# Patient Record
Sex: MALE | Race: WHITE | NOT HISPANIC OR LATINO | Employment: UNEMPLOYED | URBAN - METROPOLITAN AREA
[De-identification: names, ages, dates, MRNs, and addresses within clinical notes are randomized per-mention and may not be internally consistent; named-entity substitution may affect disease eponyms.]

---

## 2020-01-30 ENCOUNTER — APPOINTMENT (OUTPATIENT)
Dept: GENERAL RADIOLOGY | Facility: HOSPITAL | Age: 20
End: 2020-01-30

## 2020-01-30 ENCOUNTER — APPOINTMENT (OUTPATIENT)
Dept: CT IMAGING | Facility: HOSPITAL | Age: 20
End: 2020-01-30

## 2020-01-30 ENCOUNTER — HOSPITAL ENCOUNTER (OUTPATIENT)
Facility: HOSPITAL | Age: 20
Setting detail: OBSERVATION
Discharge: HOME | End: 2020-01-31
Attending: EMERGENCY MEDICINE | Admitting: INTERNAL MEDICINE

## 2020-01-30 ENCOUNTER — APPOINTMENT (OUTPATIENT)
Dept: CARDIOLOGY | Facility: HOSPITAL | Age: 20
End: 2020-01-30

## 2020-01-30 DIAGNOSIS — F15.10 METHAMPHETAMINE ABUSE (HCC): Primary | ICD-10-CM

## 2020-01-30 DIAGNOSIS — D72.829 LEUKOCYTOSIS, UNSPECIFIED TYPE: ICD-10-CM

## 2020-01-30 PROBLEM — E16.2 HYPOGLYCEMIA: Status: ACTIVE | Noted: 2020-01-30

## 2020-01-30 PROBLEM — Z72.0 TOBACCO ABUSE: Chronic | Status: ACTIVE | Noted: 2020-01-30

## 2020-01-30 LAB
ALBUMIN SERPL-MCNC: 5.1 G/DL (ref 3.5–5.2)
ALBUMIN/GLOB SERPL: 1.7 G/DL
ALP SERPL-CCNC: 74 U/L (ref 39–117)
ALT SERPL W P-5'-P-CCNC: 39 U/L (ref 1–41)
AMPHET+METHAMPHET UR QL: POSITIVE
ANION GAP SERPL CALCULATED.3IONS-SCNC: 17 MMOL/L (ref 5–15)
ANISOCYTOSIS BLD QL: ABNORMAL
APTT PPP: 21.7 SECONDS (ref 24–31)
AST SERPL-CCNC: 33 U/L (ref 1–40)
BARBITURATES UR QL SCN: NEGATIVE
BENZODIAZ UR QL SCN: NEGATIVE
BH CV ECHO MEAS - ACS: 1.9 CM
BH CV ECHO MEAS - AO MAX PG (FULL): 0.9 MMHG
BH CV ECHO MEAS - AO MAX PG: 7.6 MMHG
BH CV ECHO MEAS - AO MEAN PG (FULL): -0.06 MMHG
BH CV ECHO MEAS - AO MEAN PG: 3.6 MMHG
BH CV ECHO MEAS - AO ROOT AREA (BSA CORRECTED): 1.6
BH CV ECHO MEAS - AO ROOT AREA: 7.1 CM^2
BH CV ECHO MEAS - AO ROOT DIAM: 3 CM
BH CV ECHO MEAS - AO V2 MAX: 137.5 CM/SEC
BH CV ECHO MEAS - AO V2 MEAN: 86.1 CM/SEC
BH CV ECHO MEAS - AO V2 VTI: 23.6 CM
BH CV ECHO MEAS - AORTIC HR: 77.3 BPM
BH CV ECHO MEAS - AORTIC R-R: 0.78 SEC
BH CV ECHO MEAS - ASC AORTA: 2.5 CM
BH CV ECHO MEAS - AVA(I,A): 3.4 CM^2
BH CV ECHO MEAS - AVA(I,D): 3.4 CM^2
BH CV ECHO MEAS - AVA(V,A): 2.8 CM^2
BH CV ECHO MEAS - AVA(V,D): 2.8 CM^2
BH CV ECHO MEAS - BSA(HAYCOCK): 1.9 M^2
BH CV ECHO MEAS - BSA: 1.9 M^2
BH CV ECHO MEAS - BZI_BMI: 20.3 KILOGRAMS/M^2
BH CV ECHO MEAS - BZI_METRIC_HEIGHT: 182.9 CM
BH CV ECHO MEAS - BZI_METRIC_WEIGHT: 68 KG
BH CV ECHO MEAS - CI(AO): 6.8 L/MIN/M^2
BH CV ECHO MEAS - CI(LVOT): 3.2 L/MIN/M^2
BH CV ECHO MEAS - CO(AO): 12.9 L/MIN
BH CV ECHO MEAS - CO(LVOT): 6.1 L/MIN
BH CV ECHO MEAS - EDV(CUBED): 98.4 ML
BH CV ECHO MEAS - EDV(MOD-SP4): 109 ML
BH CV ECHO MEAS - EDV(TEICH): 98.2 ML
BH CV ECHO MEAS - EF(CUBED): 70.1 %
BH CV ECHO MEAS - EF(MOD-BP): 71 %
BH CV ECHO MEAS - EF(MOD-SP4): 70.9 %
BH CV ECHO MEAS - EF(TEICH): 61.8 %
BH CV ECHO MEAS - ESV(CUBED): 29.4 ML
BH CV ECHO MEAS - ESV(MOD-SP4): 31.8 ML
BH CV ECHO MEAS - ESV(TEICH): 37.5 ML
BH CV ECHO MEAS - FS: 33.2 %
BH CV ECHO MEAS - IVS/LVPW: 0.77
BH CV ECHO MEAS - IVSD: 0.75 CM
BH CV ECHO MEAS - LA DIMENSION(2D): 2.6 CM
BH CV ECHO MEAS - LV DIASTOLIC VOL/BSA (35-75): 57.8 ML/M^2
BH CV ECHO MEAS - LV MASS(C)D: 131.4 GRAMS
BH CV ECHO MEAS - LV MASS(C)DI: 69.7 GRAMS/M^2
BH CV ECHO MEAS - LV MAX PG: 6.7 MMHG
BH CV ECHO MEAS - LV MEAN PG: 3.6 MMHG
BH CV ECHO MEAS - LV SYSTOLIC VOL/BSA (12-30): 16.9 ML/M^2
BH CV ECHO MEAS - LV V1 MAX: 129.1 CM/SEC
BH CV ECHO MEAS - LV V1 MEAN: 88.7 CM/SEC
BH CV ECHO MEAS - LV V1 VTI: 26.7 CM
BH CV ECHO MEAS - LVIDD: 4.6 CM
BH CV ECHO MEAS - LVIDS: 3.1 CM
BH CV ECHO MEAS - LVOT AREA: 3 CM^2
BH CV ECHO MEAS - LVOT DIAM: 1.9 CM
BH CV ECHO MEAS - LVPWD: 0.98 CM
BH CV ECHO MEAS - MV A MAX VEL: 47.1 CM/SEC
BH CV ECHO MEAS - MV DEC SLOPE: 1073 CM/SEC^2
BH CV ECHO MEAS - MV DEC TIME: 0.09 SEC
BH CV ECHO MEAS - MV E MAX VEL: 99.9 CM/SEC
BH CV ECHO MEAS - MV E/A: 2.1
BH CV ECHO MEAS - MV MAX PG: 5.1 MMHG
BH CV ECHO MEAS - MV MEAN PG: 2.2 MMHG
BH CV ECHO MEAS - MV V2 MAX: 112.7 CM/SEC
BH CV ECHO MEAS - MV V2 MEAN: 70 CM/SEC
BH CV ECHO MEAS - MV V2 VTI: 25 CM
BH CV ECHO MEAS - MVA(VTI): 3.2 CM^2
BH CV ECHO MEAS - PA ACC TIME: 0.11 SEC
BH CV ECHO MEAS - PA MAX PG (FULL): -1.5 MMHG
BH CV ECHO MEAS - PA MAX PG: 5.7 MMHG
BH CV ECHO MEAS - PA MEAN PG (FULL): -0.48 MMHG
BH CV ECHO MEAS - PA MEAN PG: 2.6 MMHG
BH CV ECHO MEAS - PA PR(ACCEL): 31 MMHG
BH CV ECHO MEAS - PA V2 MAX: 119.6 CM/SEC
BH CV ECHO MEAS - PA V2 MEAN: 73.4 CM/SEC
BH CV ECHO MEAS - PA V2 VTI: 21.5 CM
BH CV ECHO MEAS - PULM A REVS DUR: 0.11 SEC
BH CV ECHO MEAS - PULM A REVS VEL: 31.2 CM/SEC
BH CV ECHO MEAS - PULM DIAS VEL: 59 CM/SEC
BH CV ECHO MEAS - PULM S/D: 1.5
BH CV ECHO MEAS - PULM SYS VEL: 86.5 CM/SEC
BH CV ECHO MEAS - PVA(I,A): 5.9 CM^2
BH CV ECHO MEAS - PVA(I,D): 5.9 CM^2
BH CV ECHO MEAS - PVA(V,A): 5.5 CM^2
BH CV ECHO MEAS - PVA(V,D): 5.5 CM^2
BH CV ECHO MEAS - QP/QS: 1.6
BH CV ECHO MEAS - RAP SYSTOLE: 3 MMHG
BH CV ECHO MEAS - RV MAX PG: 7.3 MMHG
BH CV ECHO MEAS - RV MEAN PG: 3.1 MMHG
BH CV ECHO MEAS - RV V1 MAX: 134.8 CM/SEC
BH CV ECHO MEAS - RV V1 MEAN: 79.2 CM/SEC
BH CV ECHO MEAS - RV V1 VTI: 26.2 CM
BH CV ECHO MEAS - RVDD: 2.6 CM
BH CV ECHO MEAS - RVOT AREA: 4.9 CM^2
BH CV ECHO MEAS - RVOT DIAM: 2.5 CM
BH CV ECHO MEAS - RVSP: 11.6 MMHG
BH CV ECHO MEAS - SI(AO): 88.5 ML/M^2
BH CV ECHO MEAS - SI(CUBED): 36.6 ML/M^2
BH CV ECHO MEAS - SI(LVOT): 42 ML/M^2
BH CV ECHO MEAS - SI(MOD-SP4): 41 ML/M^2
BH CV ECHO MEAS - SI(TEICH): 32.2 ML/M^2
BH CV ECHO MEAS - SV(AO): 166.8 ML
BH CV ECHO MEAS - SV(CUBED): 69.1 ML
BH CV ECHO MEAS - SV(LVOT): 79.1 ML
BH CV ECHO MEAS - SV(MOD-SP4): 77.3 ML
BH CV ECHO MEAS - SV(RVOT): 127.8 ML
BH CV ECHO MEAS - SV(TEICH): 60.7 ML
BH CV ECHO MEAS - TR MAX VEL: 146.8 CM/SEC
BILIRUB SERPL-MCNC: 0.8 MG/DL (ref 0.2–1.2)
BILIRUB UR QL STRIP: NEGATIVE
BUN BLD-MCNC: 25 MG/DL (ref 6–20)
BUN/CREAT SERPL: 20.5 (ref 7–25)
CA-I SERPL ISE-MCNC: 1.23 MMOL/L (ref 1.2–1.3)
CALCIUM SPEC-SCNC: 9.5 MG/DL (ref 8.6–10.5)
CANNABINOIDS SERPL QL: POSITIVE
CHLORIDE SERPL-SCNC: 99 MMOL/L (ref 98–107)
CK SERPL-CCNC: 275 U/L (ref 20–200)
CLARITY UR: CLEAR
CO2 SERPL-SCNC: 23 MMOL/L (ref 22–29)
COCAINE UR QL: NEGATIVE
COLOR UR: YELLOW
CREAT BLD-MCNC: 0.96 MG/DL (ref 0.76–1.27)
CREAT BLD-MCNC: 1.22 MG/DL (ref 0.76–1.27)
D-LACTATE SERPL-SCNC: 0.7 MMOL/L (ref 0.5–2)
D-LACTATE SERPL-SCNC: 0.8 MMOL/L (ref 0.5–2)
D-LACTATE SERPL-SCNC: 1.6 MMOL/L (ref 0.5–2)
DEPRECATED RDW RBC AUTO: 41.6 FL (ref 37–54)
ERYTHROCYTE [DISTWIDTH] IN BLOOD BY AUTOMATED COUNT: 13 % (ref 12.3–15.4)
ETHANOL UR QL: <0.01 %
GFR SERPL CREATININE-BSD FRML MDRD: 100 ML/MIN/1.73
GFR SERPL CREATININE-BSD FRML MDRD: 76 ML/MIN/1.73
GIANT PLATELETS: ABNORMAL
GLOBULIN UR ELPH-MCNC: 3 GM/DL
GLUCOSE BLD-MCNC: 44 MG/DL (ref 65–99)
GLUCOSE BLDC GLUCOMTR-MCNC: 113 MG/DL (ref 70–105)
GLUCOSE BLDC GLUCOMTR-MCNC: 129 MG/DL (ref 70–105)
GLUCOSE BLDC GLUCOMTR-MCNC: 182 MG/DL (ref 70–105)
GLUCOSE BLDC GLUCOMTR-MCNC: 72 MG/DL (ref 70–105)
GLUCOSE BLDC GLUCOMTR-MCNC: 81 MG/DL (ref 70–105)
GLUCOSE BLDC GLUCOMTR-MCNC: 93 MG/DL (ref 70–105)
GLUCOSE UR STRIP-MCNC: ABNORMAL MG/DL
HCT VFR BLD AUTO: 42.8 % (ref 37.5–51)
HGB BLD-MCNC: 14.8 G/DL (ref 13–17.7)
HGB UR QL STRIP.AUTO: NEGATIVE
HOLD SPECIMEN: NORMAL
HOLD SPECIMEN: NORMAL
INR PPP: 1.07 (ref 0.9–1.1)
KETONES UR QL STRIP: ABNORMAL
LEUKOCYTE ESTERASE UR QL STRIP.AUTO: NEGATIVE
LIPASE SERPL-CCNC: 18 U/L (ref 13–60)
LV EF 2D ECHO EST: 60 %
LYMPHOCYTES # BLD MANUAL: 2.07 10*3/MM3 (ref 0.7–3.1)
LYMPHOCYTES NFR BLD MANUAL: 5 % (ref 19.6–45.3)
LYMPHOCYTES NFR BLD MANUAL: 9 % (ref 5–12)
MAGNESIUM SERPL-MCNC: 2.3 MG/DL (ref 1.7–2.2)
MAGNESIUM SERPL-MCNC: 2.3 MG/DL (ref 1.7–2.2)
MCH RBC QN AUTO: 31.9 PG (ref 26.6–33)
MCHC RBC AUTO-ENTMCNC: 34.7 G/DL (ref 31.5–35.7)
MCV RBC AUTO: 91.9 FL (ref 79–97)
METHADONE UR QL SCN: NEGATIVE
MONOCYTES # BLD AUTO: 3.72 10*3/MM3 (ref 0.1–0.9)
NEUTROPHILS # BLD AUTO: 35.52 10*3/MM3 (ref 1.7–7)
NEUTROPHILS NFR BLD MANUAL: 70 % (ref 42.7–76)
NEUTS BAND NFR BLD MANUAL: 16 % (ref 0–5)
NITRITE UR QL STRIP: NEGATIVE
OPIATES UR QL: NEGATIVE
OXYCODONE UR QL SCN: NEGATIVE
PH UR STRIP.AUTO: 6 [PH] (ref 5–8)
PHOSPHATE SERPL-MCNC: 2.9 MG/DL (ref 2.5–4.5)
PLATELET # BLD AUTO: 450 10*3/MM3 (ref 140–450)
PMV BLD AUTO: 7.2 FL (ref 6–12)
POTASSIUM BLD-SCNC: 3.4 MMOL/L (ref 3.5–5.2)
POTASSIUM BLD-SCNC: 4.1 MMOL/L (ref 3.5–5.2)
PROCALCITONIN SERPL-MCNC: 1.81 NG/ML (ref 0.1–0.25)
PROT SERPL-MCNC: 8.1 G/DL (ref 6–8.5)
PROT UR QL STRIP: ABNORMAL
PROTHROMBIN TIME: 11.1 SECONDS (ref 9.6–11.7)
RBC # BLD AUTO: 4.66 10*6/MM3 (ref 4.14–5.8)
SCAN SLIDE: NORMAL
SODIUM BLD-SCNC: 139 MMOL/L (ref 136–145)
SP GR UR STRIP: 1.04 (ref 1–1.03)
TOXIC GRANULATION: ABNORMAL
TROPONIN T SERPL-MCNC: <0.01 NG/ML (ref 0–0.03)
TROPONIN T SERPL-MCNC: <0.01 NG/ML (ref 0–0.03)
UROBILINOGEN UR QL STRIP: ABNORMAL
WBC NRBC COR # BLD: 41.3 10*3/MM3 (ref 3.4–10.8)
WHOLE BLOOD HOLD SPECIMEN: NORMAL
WHOLE BLOOD HOLD SPECIMEN: NORMAL

## 2020-01-30 PROCEDURE — 72170 X-RAY EXAM OF PELVIS: CPT

## 2020-01-30 PROCEDURE — 0 IOPAMIDOL PER 1 ML: Performed by: EMERGENCY MEDICINE

## 2020-01-30 PROCEDURE — 84484 ASSAY OF TROPONIN QUANT: CPT | Performed by: NURSE PRACTITIONER

## 2020-01-30 PROCEDURE — 25010000002 LORAZEPAM PER 2 MG: Performed by: EMERGENCY MEDICINE

## 2020-01-30 PROCEDURE — G0378 HOSPITAL OBSERVATION PER HR: HCPCS

## 2020-01-30 PROCEDURE — 80307 DRUG TEST PRSMV CHEM ANLYZR: CPT | Performed by: EMERGENCY MEDICINE

## 2020-01-30 PROCEDURE — 73552 X-RAY EXAM OF FEMUR 2/>: CPT

## 2020-01-30 PROCEDURE — 93005 ELECTROCARDIOGRAM TRACING: CPT | Performed by: EMERGENCY MEDICINE

## 2020-01-30 PROCEDURE — 96361 HYDRATE IV INFUSION ADD-ON: CPT

## 2020-01-30 PROCEDURE — 96376 TX/PRO/DX INJ SAME DRUG ADON: CPT

## 2020-01-30 PROCEDURE — 85730 THROMBOPLASTIN TIME PARTIAL: CPT | Performed by: EMERGENCY MEDICINE

## 2020-01-30 PROCEDURE — 84100 ASSAY OF PHOSPHORUS: CPT | Performed by: INTERNAL MEDICINE

## 2020-01-30 PROCEDURE — 84132 ASSAY OF SERUM POTASSIUM: CPT | Performed by: INTERNAL MEDICINE

## 2020-01-30 PROCEDURE — 82565 ASSAY OF CREATININE: CPT | Performed by: NURSE PRACTITIONER

## 2020-01-30 PROCEDURE — 96366 THER/PROPH/DIAG IV INF ADDON: CPT

## 2020-01-30 PROCEDURE — 87076 CULTURE ANAEROBE IDENT EACH: CPT | Performed by: EMERGENCY MEDICINE

## 2020-01-30 PROCEDURE — 71275 CT ANGIOGRAPHY CHEST: CPT

## 2020-01-30 PROCEDURE — 93005 ELECTROCARDIOGRAM TRACING: CPT | Performed by: INTERNAL MEDICINE

## 2020-01-30 PROCEDURE — 82550 ASSAY OF CK (CPK): CPT | Performed by: EMERGENCY MEDICINE

## 2020-01-30 PROCEDURE — 83690 ASSAY OF LIPASE: CPT | Performed by: EMERGENCY MEDICINE

## 2020-01-30 PROCEDURE — 25010000002 VANCOMYCIN 10 G RECONSTITUTED SOLUTION: Performed by: EMERGENCY MEDICINE

## 2020-01-30 PROCEDURE — 96365 THER/PROPH/DIAG IV INF INIT: CPT

## 2020-01-30 PROCEDURE — 83735 ASSAY OF MAGNESIUM: CPT | Performed by: INTERNAL MEDICINE

## 2020-01-30 PROCEDURE — 25010000002 CEFEPIME PER 500 MG: Performed by: NURSE PRACTITIONER

## 2020-01-30 PROCEDURE — 85025 COMPLETE CBC W/AUTO DIFF WBC: CPT | Performed by: EMERGENCY MEDICINE

## 2020-01-30 PROCEDURE — 96375 TX/PRO/DX INJ NEW DRUG ADDON: CPT

## 2020-01-30 PROCEDURE — 87040 BLOOD CULTURE FOR BACTERIA: CPT | Performed by: EMERGENCY MEDICINE

## 2020-01-30 PROCEDURE — 93306 TTE W/DOPPLER COMPLETE: CPT

## 2020-01-30 PROCEDURE — 82962 GLUCOSE BLOOD TEST: CPT

## 2020-01-30 PROCEDURE — 85007 BL SMEAR W/DIFF WBC COUNT: CPT | Performed by: EMERGENCY MEDICINE

## 2020-01-30 PROCEDURE — 83605 ASSAY OF LACTIC ACID: CPT

## 2020-01-30 PROCEDURE — 93010 ELECTROCARDIOGRAM REPORT: CPT | Performed by: INTERNAL MEDICINE

## 2020-01-30 PROCEDURE — 85610 PROTHROMBIN TIME: CPT | Performed by: EMERGENCY MEDICINE

## 2020-01-30 PROCEDURE — 74177 CT ABD & PELVIS W/CONTRAST: CPT

## 2020-01-30 PROCEDURE — 82330 ASSAY OF CALCIUM: CPT | Performed by: INTERNAL MEDICINE

## 2020-01-30 PROCEDURE — 81003 URINALYSIS AUTO W/O SCOPE: CPT | Performed by: EMERGENCY MEDICINE

## 2020-01-30 PROCEDURE — 87150 DNA/RNA AMPLIFIED PROBE: CPT | Performed by: EMERGENCY MEDICINE

## 2020-01-30 PROCEDURE — 99220 PR INITIAL OBSERVATION CARE/DAY 70 MINUTES: CPT | Performed by: INTERNAL MEDICINE

## 2020-01-30 PROCEDURE — 99285 EMERGENCY DEPT VISIT HI MDM: CPT

## 2020-01-30 PROCEDURE — 80053 COMPREHEN METABOLIC PANEL: CPT | Performed by: EMERGENCY MEDICINE

## 2020-01-30 PROCEDURE — 84484 ASSAY OF TROPONIN QUANT: CPT | Performed by: INTERNAL MEDICINE

## 2020-01-30 PROCEDURE — 25010000002 CEFEPIME PER 500 MG: Performed by: EMERGENCY MEDICINE

## 2020-01-30 PROCEDURE — 84145 PROCALCITONIN (PCT): CPT | Performed by: NURSE PRACTITIONER

## 2020-01-30 PROCEDURE — 83605 ASSAY OF LACTIC ACID: CPT | Performed by: NURSE PRACTITIONER

## 2020-01-30 PROCEDURE — 25010000002 VANCOMYCIN 1 G RECONSTITUTED SOLUTION 1 EACH VIAL: Performed by: NURSE PRACTITIONER

## 2020-01-30 PROCEDURE — 93306 TTE W/DOPPLER COMPLETE: CPT | Performed by: INTERNAL MEDICINE

## 2020-01-30 RX ORDER — NITROGLYCERIN 0.4 MG/1
0.4 TABLET SUBLINGUAL
Status: DISCONTINUED | OUTPATIENT
Start: 2020-01-30 | End: 2020-02-01 | Stop reason: HOSPADM

## 2020-01-30 RX ORDER — POTASSIUM CHLORIDE 20 MEQ/1
40 TABLET, EXTENDED RELEASE ORAL AS NEEDED
Status: DISCONTINUED | OUTPATIENT
Start: 2020-01-30 | End: 2020-02-01 | Stop reason: HOSPADM

## 2020-01-30 RX ORDER — NICOTINE 21 MG/24HR
1 PATCH, TRANSDERMAL 24 HOURS TRANSDERMAL
Status: DISCONTINUED | OUTPATIENT
Start: 2020-01-30 | End: 2020-02-01 | Stop reason: HOSPADM

## 2020-01-30 RX ORDER — ONDANSETRON 2 MG/ML
4 INJECTION INTRAMUSCULAR; INTRAVENOUS EVERY 6 HOURS PRN
Status: DISCONTINUED | OUTPATIENT
Start: 2020-01-30 | End: 2020-02-01 | Stop reason: HOSPADM

## 2020-01-30 RX ORDER — SODIUM CHLORIDE 0.9 % (FLUSH) 0.9 %
10 SYRINGE (ML) INJECTION EVERY 12 HOURS SCHEDULED
Status: DISCONTINUED | OUTPATIENT
Start: 2020-01-30 | End: 2020-02-01 | Stop reason: HOSPADM

## 2020-01-30 RX ORDER — MAGNESIUM SULFATE HEPTAHYDRATE 40 MG/ML
4 INJECTION, SOLUTION INTRAVENOUS AS NEEDED
Status: DISCONTINUED | OUTPATIENT
Start: 2020-01-30 | End: 2020-02-01 | Stop reason: HOSPADM

## 2020-01-30 RX ORDER — SODIUM CHLORIDE 0.9 % (FLUSH) 0.9 %
10 SYRINGE (ML) INJECTION AS NEEDED
Status: DISCONTINUED | OUTPATIENT
Start: 2020-01-30 | End: 2020-02-01 | Stop reason: HOSPADM

## 2020-01-30 RX ORDER — MAGNESIUM SULFATE HEPTAHYDRATE 40 MG/ML
2 INJECTION, SOLUTION INTRAVENOUS AS NEEDED
Status: DISCONTINUED | OUTPATIENT
Start: 2020-01-30 | End: 2020-02-01 | Stop reason: HOSPADM

## 2020-01-30 RX ORDER — DEXTROSE MONOHYDRATE 25 G/50ML
INJECTION, SOLUTION INTRAVENOUS
Status: COMPLETED
Start: 2020-01-30 | End: 2020-01-30

## 2020-01-30 RX ORDER — DEXTROSE MONOHYDRATE 25 G/50ML
25 INJECTION, SOLUTION INTRAVENOUS ONCE
Status: COMPLETED | OUTPATIENT
Start: 2020-01-30 | End: 2020-01-30

## 2020-01-30 RX ORDER — SODIUM CHLORIDE 9 MG/ML
125 INJECTION, SOLUTION INTRAVENOUS CONTINUOUS
Status: DISCONTINUED | OUTPATIENT
Start: 2020-01-30 | End: 2020-01-30

## 2020-01-30 RX ORDER — DEXTROSE AND SODIUM CHLORIDE 5; .45 G/100ML; G/100ML
75 INJECTION, SOLUTION INTRAVENOUS CONTINUOUS
Status: DISCONTINUED | OUTPATIENT
Start: 2020-01-30 | End: 2020-02-01 | Stop reason: HOSPADM

## 2020-01-30 RX ORDER — POTASSIUM CHLORIDE 1.5 G/1.77G
40 POWDER, FOR SOLUTION ORAL AS NEEDED
Status: DISCONTINUED | OUTPATIENT
Start: 2020-01-30 | End: 2020-02-01 | Stop reason: HOSPADM

## 2020-01-30 RX ORDER — ONDANSETRON 4 MG/1
4 TABLET, FILM COATED ORAL EVERY 6 HOURS PRN
Status: DISCONTINUED | OUTPATIENT
Start: 2020-01-30 | End: 2020-02-01 | Stop reason: HOSPADM

## 2020-01-30 RX ORDER — LORAZEPAM 2 MG/ML
1 INJECTION INTRAMUSCULAR ONCE
Status: COMPLETED | OUTPATIENT
Start: 2020-01-30 | End: 2020-01-30

## 2020-01-30 RX ORDER — VANCOMYCIN HYDROCHLORIDE
1500 ONCE
Status: COMPLETED | OUTPATIENT
Start: 2020-01-30 | End: 2020-01-30

## 2020-01-30 RX ORDER — PANTOPRAZOLE SODIUM 40 MG/1
40 TABLET, DELAYED RELEASE ORAL
Status: DISCONTINUED | OUTPATIENT
Start: 2020-01-31 | End: 2020-02-01 | Stop reason: HOSPADM

## 2020-01-30 RX ADMIN — NICOTINE 1 PATCH: 14 PATCH TRANSDERMAL at 17:25

## 2020-01-30 RX ADMIN — CEFEPIME HYDROCHLORIDE 2 G: 2 INJECTION, POWDER, FOR SOLUTION INTRAVENOUS at 15:16

## 2020-01-30 RX ADMIN — IOPAMIDOL 100 ML: 755 INJECTION, SOLUTION INTRAVENOUS at 04:37

## 2020-01-30 RX ADMIN — DEXTROSE 50 % IN WATER (D50W) INTRAVENOUS SYRINGE: at 06:46

## 2020-01-30 RX ADMIN — SODIUM CHLORIDE 125 ML/HR: 900 INJECTION, SOLUTION INTRAVENOUS at 08:31

## 2020-01-30 RX ADMIN — LORAZEPAM 1 MG: 2 INJECTION INTRAMUSCULAR; INTRAVENOUS at 04:47

## 2020-01-30 RX ADMIN — Medication 10 ML: at 20:26

## 2020-01-30 RX ADMIN — DEXTROSE 50 % IN WATER (D50W) INTRAVENOUS SYRINGE 25 G: at 04:17

## 2020-01-30 RX ADMIN — CEFEPIME HYDROCHLORIDE 2 G: 2 INJECTION, POWDER, FOR SOLUTION INTRAVENOUS at 22:26

## 2020-01-30 RX ADMIN — SODIUM CHLORIDE 1000 MG: 900 INJECTION, SOLUTION INTRAVENOUS at 20:26

## 2020-01-30 RX ADMIN — Medication 10 ML: at 08:33

## 2020-01-30 RX ADMIN — DEXTROSE MONOHYDRATE AND SODIUM CHLORIDE 75 ML/HR: 5; .45 INJECTION, SOLUTION INTRAVENOUS at 18:05

## 2020-01-30 RX ADMIN — CEFEPIME HYDROCHLORIDE 2 G: 2 INJECTION, POWDER, FOR SOLUTION INTRAVENOUS at 05:20

## 2020-01-30 RX ADMIN — VANCOMYCIN HYDROCHLORIDE 1500 MG: 10 INJECTION, POWDER, LYOPHILIZED, FOR SOLUTION INTRAVENOUS at 05:20

## 2020-01-30 RX ADMIN — SODIUM CHLORIDE 1000 MG: 900 INJECTION, SOLUTION INTRAVENOUS at 12:50

## 2020-01-30 RX ADMIN — SODIUM CHLORIDE 2000 ML: 900 INJECTION, SOLUTION INTRAVENOUS at 04:18

## 2020-01-31 VITALS
TEMPERATURE: 98.2 F | HEIGHT: 72 IN | DIASTOLIC BLOOD PRESSURE: 69 MMHG | SYSTOLIC BLOOD PRESSURE: 110 MMHG | WEIGHT: 147.71 LBS | HEART RATE: 80 BPM | RESPIRATION RATE: 17 BRPM | BODY MASS INDEX: 20.01 KG/M2 | OXYGEN SATURATION: 99 %

## 2020-01-31 LAB
ANION GAP SERPL CALCULATED.3IONS-SCNC: 7 MMOL/L (ref 5–15)
BASOPHILS # BLD AUTO: 0 10*3/MM3 (ref 0–0.2)
BASOPHILS NFR BLD AUTO: 0.4 % (ref 0–1.5)
BUN BLD-MCNC: 9 MG/DL (ref 6–20)
BUN/CREAT SERPL: 16.1 (ref 7–25)
CALCIUM SPEC-SCNC: 8.5 MG/DL (ref 8.6–10.5)
CHLORIDE SERPL-SCNC: 110 MMOL/L (ref 98–107)
CO2 SERPL-SCNC: 23 MMOL/L (ref 22–29)
CREAT BLD-MCNC: 0.56 MG/DL (ref 0.76–1.27)
D-LACTATE SERPL-SCNC: 0.7 MMOL/L (ref 0.5–2)
D-LACTATE SERPL-SCNC: 1.5 MMOL/L (ref 0.5–2)
D-LACTATE SERPL-SCNC: 1.7 MMOL/L (ref 0.5–2)
D-LACTATE SERPL-SCNC: 2 MMOL/L (ref 0.5–2)
DEPRECATED RDW RBC AUTO: 42 FL (ref 37–54)
EOSINOPHIL # BLD AUTO: 0.2 10*3/MM3 (ref 0–0.4)
EOSINOPHIL NFR BLD AUTO: 2.9 % (ref 0.3–6.2)
ERYTHROCYTE [DISTWIDTH] IN BLOOD BY AUTOMATED COUNT: 13 % (ref 12.3–15.4)
GFR SERPL CREATININE-BSD FRML MDRD: >150 ML/MIN/1.73
GLUCOSE BLD-MCNC: 99 MG/DL (ref 65–99)
GLUCOSE BLDC GLUCOMTR-MCNC: 107 MG/DL (ref 70–105)
GLUCOSE BLDC GLUCOMTR-MCNC: 109 MG/DL (ref 70–105)
GLUCOSE BLDC GLUCOMTR-MCNC: 89 MG/DL (ref 70–105)
GLUCOSE BLDC GLUCOMTR-MCNC: 90 MG/DL (ref 70–105)
GLUCOSE BLDC GLUCOMTR-MCNC: 92 MG/DL (ref 70–105)
GLUCOSE BLDC GLUCOMTR-MCNC: 94 MG/DL (ref 70–105)
HAV IGM SERPL QL IA: NORMAL
HBV CORE IGM SERPL QL IA: NORMAL
HBV SURFACE AG SERPL QL IA: NORMAL
HCT VFR BLD AUTO: 37.6 % (ref 37.5–51)
HCV AB SER DONR QL: NORMAL
HGB BLD-MCNC: 13 G/DL (ref 13–17.7)
HIV1+2 AB SER QL: NORMAL
LYMPHOCYTES # BLD AUTO: 3 10*3/MM3 (ref 0.7–3.1)
LYMPHOCYTES NFR BLD AUTO: 38.9 % (ref 19.6–45.3)
MAGNESIUM SERPL-MCNC: 1.9 MG/DL (ref 1.7–2.2)
MCH RBC QN AUTO: 32.2 PG (ref 26.6–33)
MCHC RBC AUTO-ENTMCNC: 34.6 G/DL (ref 31.5–35.7)
MCV RBC AUTO: 93.1 FL (ref 79–97)
MONOCYTES # BLD AUTO: 0.9 10*3/MM3 (ref 0.1–0.9)
MONOCYTES NFR BLD AUTO: 12 % (ref 5–12)
NEUTROPHILS # BLD AUTO: 3.6 10*3/MM3 (ref 1.7–7)
NEUTROPHILS NFR BLD AUTO: 45.8 % (ref 42.7–76)
NRBC BLD AUTO-RTO: 0 /100 WBC (ref 0–0.2)
PLATELET # BLD AUTO: 316 10*3/MM3 (ref 140–450)
PMV BLD AUTO: 7.8 FL (ref 6–12)
POTASSIUM BLD-SCNC: 4.1 MMOL/L (ref 3.5–5.2)
PROCALCITONIN SERPL-MCNC: 4.01 NG/ML (ref 0.1–0.25)
RBC # BLD AUTO: 4.04 10*6/MM3 (ref 4.14–5.8)
SODIUM BLD-SCNC: 140 MMOL/L (ref 136–145)
VANCOMYCIN PEAK SERPL-MCNC: 11.8 MCG/ML (ref 20–40)
VANCOMYCIN TROUGH SERPL-MCNC: 7.7 MCG/ML (ref 5–20)
WBC NRBC COR # BLD: 7.8 10*3/MM3 (ref 3.4–10.8)

## 2020-01-31 PROCEDURE — 83605 ASSAY OF LACTIC ACID: CPT | Performed by: NURSE PRACTITIONER

## 2020-01-31 PROCEDURE — 83735 ASSAY OF MAGNESIUM: CPT | Performed by: INTERNAL MEDICINE

## 2020-01-31 PROCEDURE — 80202 ASSAY OF VANCOMYCIN: CPT | Performed by: NURSE PRACTITIONER

## 2020-01-31 PROCEDURE — 99224 PR SBSQ OBSERVATION CARE/DAY 15 MINUTES: CPT | Performed by: INTERNAL MEDICINE

## 2020-01-31 PROCEDURE — 80074 ACUTE HEPATITIS PANEL: CPT | Performed by: INTERNAL MEDICINE

## 2020-01-31 PROCEDURE — 82962 GLUCOSE BLOOD TEST: CPT

## 2020-01-31 PROCEDURE — 85025 COMPLETE CBC W/AUTO DIFF WBC: CPT | Performed by: NURSE PRACTITIONER

## 2020-01-31 PROCEDURE — 80048 BASIC METABOLIC PNL TOTAL CA: CPT | Performed by: NURSE PRACTITIONER

## 2020-01-31 PROCEDURE — 25010000002 CEFEPIME PER 500 MG: Performed by: NURSE PRACTITIONER

## 2020-01-31 PROCEDURE — 87040 BLOOD CULTURE FOR BACTERIA: CPT | Performed by: INTERNAL MEDICINE

## 2020-01-31 PROCEDURE — 96366 THER/PROPH/DIAG IV INF ADDON: CPT

## 2020-01-31 PROCEDURE — G0432 EIA HIV-1/HIV-2 SCREEN: HCPCS | Performed by: INTERNAL MEDICINE

## 2020-01-31 PROCEDURE — 25010000002 VANCOMYCIN 1 G RECONSTITUTED SOLUTION 1 EACH VIAL: Performed by: NURSE PRACTITIONER

## 2020-01-31 PROCEDURE — 84145 PROCALCITONIN (PCT): CPT | Performed by: INTERNAL MEDICINE

## 2020-01-31 PROCEDURE — G0378 HOSPITAL OBSERVATION PER HR: HCPCS

## 2020-01-31 RX ADMIN — CEFEPIME HYDROCHLORIDE 2 G: 2 INJECTION, POWDER, FOR SOLUTION INTRAVENOUS at 05:42

## 2020-01-31 RX ADMIN — Medication 10 ML: at 08:28

## 2020-01-31 RX ADMIN — PANTOPRAZOLE SODIUM 40 MG: 40 TABLET, DELAYED RELEASE ORAL at 05:43

## 2020-01-31 RX ADMIN — Medication 10 ML: at 21:00

## 2020-01-31 RX ADMIN — NICOTINE 1 PATCH: 14 PATCH TRANSDERMAL at 17:30

## 2020-01-31 RX ADMIN — SODIUM CHLORIDE 1000 MG: 900 INJECTION, SOLUTION INTRAVENOUS at 04:21

## 2020-01-31 RX ADMIN — SODIUM CHLORIDE 1000 MG: 900 INJECTION, SOLUTION INTRAVENOUS at 12:33

## 2020-02-01 ENCOUNTER — DOCUMENTATION (OUTPATIENT)
Dept: INTERNAL MEDICINE | Facility: HOSPITAL | Age: 20
End: 2020-02-01

## 2020-02-01 LAB — HOLD SPECIMEN: NORMAL
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
## 2020-02-01 NOTE — NURSING NOTE
Late entry:  Dr. Art's answering service called and spoke to Sharri regarding patient leaving AMA last night.

## 2020-02-01 NOTE — PROGRESS NOTES
Pt left AMA, was informed by Charge GAYATHRI Orellana. Did not see patient, patient left before the ARNP was notified.
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
## 2020-02-02 LAB — BACTERIA BLD CULT: ABNORMAL

## 2020-02-03 NOTE — PROGRESS NOTES
Case Management Discharge Note      Final Note: Home.    Provided post acute provider list?: Yes  Post Acute Provider List: Physician  Delivered To: Support Person  Support Person: Barriers to D/C: MD had ordered cardiac workup   Method of Delivery: In person      Final Discharge Disposition Code: 01 - home or self-care

## 2020-02-04 LAB
BACTERIA SPEC AEROBE CULT: ABNORMAL
BACTERIA SPEC AEROBE CULT: ABNORMAL
BACTERIA SPEC AEROBE CULT: NORMAL
GRAM STN SPEC: ABNORMAL
GRAM STN SPEC: ABNORMAL
ISOLATED FROM: ABNORMAL
ISOLATED FROM: ABNORMAL

## 2020-02-05 LAB
BACTERIA SPEC AEROBE CULT: NORMAL
BACTERIA SPEC AEROBE CULT: NORMAL

## 2020-07-21 ENCOUNTER — HOSPITAL ENCOUNTER (EMERGENCY)
Facility: HOSPITAL | Age: 20
Discharge: HOME | End: 2020-07-22
Admitting: EMERGENCY MEDICINE

## 2020-07-21 DIAGNOSIS — S61.216A LACERATION OF RIGHT LITTLE FINGER WITHOUT FOREIGN BODY WITHOUT DAMAGE TO NAIL, INITIAL ENCOUNTER: Primary | ICD-10-CM

## 2020-07-21 PROCEDURE — 90471 IMMUNIZATION ADMIN: CPT | Performed by: NURSE PRACTITIONER

## 2020-07-21 PROCEDURE — 99283 EMERGENCY DEPT VISIT LOW MDM: CPT

## 2020-07-21 PROCEDURE — 90715 TDAP VACCINE 7 YRS/> IM: CPT | Performed by: NURSE PRACTITIONER

## 2020-07-21 PROCEDURE — 25010000002 TDAP 5-2.5-18.5 LF-MCG/0.5 SUSPENSION: Performed by: NURSE PRACTITIONER

## 2020-07-21 RX ADMIN — TETANUS TOXOID, REDUCED DIPHTHERIA TOXOID AND ACELLULAR PERTUSSIS VACCINE, ADSORBED 0.5 ML: 5; 2.5; 8; 8; 2.5 SUSPENSION INTRAMUSCULAR at 23:46

## 2020-07-22 VITALS
WEIGHT: 160 LBS | SYSTOLIC BLOOD PRESSURE: 140 MMHG | RESPIRATION RATE: 18 BRPM | HEIGHT: 73 IN | TEMPERATURE: 99 F | HEART RATE: 99 BPM | OXYGEN SATURATION: 97 % | BODY MASS INDEX: 21.2 KG/M2 | DIASTOLIC BLOOD PRESSURE: 74 MMHG

## 2020-08-24 ENCOUNTER — HOSPITAL ENCOUNTER (EMERGENCY)
Facility: HOSPITAL | Age: 20
Discharge: LEFT BEFORE DISCHARGE | End: 2020-08-24
Attending: EMERGENCY MEDICINE | Admitting: EMERGENCY MEDICINE

## 2020-08-24 VITALS
HEIGHT: 73 IN | OXYGEN SATURATION: 99 % | TEMPERATURE: 99.5 F | DIASTOLIC BLOOD PRESSURE: 74 MMHG | BODY MASS INDEX: 21.2 KG/M2 | RESPIRATION RATE: 18 BRPM | HEART RATE: 142 BPM | WEIGHT: 160 LBS | SYSTOLIC BLOOD PRESSURE: 131 MMHG

## 2020-08-24 DIAGNOSIS — D72.829 LEUKOCYTOSIS, UNSPECIFIED TYPE: Primary | ICD-10-CM

## 2020-08-24 DIAGNOSIS — N17.9 ACUTE KIDNEY INJURY (HCC): ICD-10-CM

## 2020-08-24 DIAGNOSIS — F15.10 METHAMPHETAMINE ABUSE (HCC): ICD-10-CM

## 2020-08-24 LAB
ALBUMIN SERPL-MCNC: 5.5 G/DL (ref 3.5–5.2)
ALBUMIN/GLOB SERPL: 1.7 G/DL
ALP SERPL-CCNC: 88 U/L (ref 39–117)
ALT SERPL W P-5'-P-CCNC: 22 U/L (ref 1–41)
AMPHET+METHAMPHET UR QL: POSITIVE
ANION GAP SERPL CALCULATED.3IONS-SCNC: 14 MMOL/L (ref 5–15)
AST SERPL-CCNC: 24 U/L (ref 1–40)
BACTERIA UR QL AUTO: ABNORMAL /HPF
BARBITURATES UR QL SCN: NEGATIVE
BENZODIAZ UR QL SCN: NEGATIVE
BILIRUB SERPL-MCNC: 0.4 MG/DL (ref 0–1.2)
BILIRUB UR QL STRIP: NEGATIVE
BUN SERPL-MCNC: 17 MG/DL (ref 6–20)
BUN SERPL-MCNC: ABNORMAL MG/DL
BUN/CREAT SERPL: ABNORMAL
CALCIUM SPEC-SCNC: 10.4 MG/DL (ref 8.6–10.5)
CANNABINOIDS SERPL QL: POSITIVE
CHLORIDE SERPL-SCNC: 98 MMOL/L (ref 98–107)
CK SERPL-CCNC: 215 U/L (ref 20–200)
CLARITY UR: ABNORMAL
CO2 SERPL-SCNC: 27 MMOL/L (ref 22–29)
COCAINE UR QL: NEGATIVE
COLOR UR: YELLOW
CREAT SERPL-MCNC: 1.73 MG/DL (ref 0.76–1.27)
D-LACTATE SERPL-SCNC: 1 MMOL/L (ref 0.5–2)
DEPRECATED RDW RBC AUTO: 40.3 FL (ref 37–54)
ERYTHROCYTE [DISTWIDTH] IN BLOOD BY AUTOMATED COUNT: 12.7 % (ref 12.3–15.4)
GFR SERPL CREATININE-BSD FRML MDRD: 50 ML/MIN/1.73
GLOBULIN UR ELPH-MCNC: 3.3 GM/DL
GLUCOSE SERPL-MCNC: 89 MG/DL (ref 65–99)
GLUCOSE UR STRIP-MCNC: NEGATIVE MG/DL
GRAN CASTS URNS QL MICRO: ABNORMAL /LPF
HCT VFR BLD AUTO: 42.9 % (ref 37.5–51)
HGB BLD-MCNC: 15.2 G/DL (ref 13–17.7)
HGB UR QL STRIP.AUTO: NEGATIVE
HOLD SPECIMEN: NORMAL
HYALINE CASTS UR QL AUTO: ABNORMAL /LPF
KETONES UR QL STRIP: NEGATIVE
LARGE PLATELETS: ABNORMAL
LEUKOCYTE ESTERASE UR QL STRIP.AUTO: NEGATIVE
LYMPHOCYTES # BLD MANUAL: 2.44 10*3/MM3 (ref 0.7–3.1)
LYMPHOCYTES NFR BLD MANUAL: 8 % (ref 5–12)
LYMPHOCYTES NFR BLD MANUAL: 9 % (ref 19.6–45.3)
MCH RBC QN AUTO: 31.8 PG (ref 26.6–33)
MCHC RBC AUTO-ENTMCNC: 35.3 G/DL (ref 31.5–35.7)
MCV RBC AUTO: 90.1 FL (ref 79–97)
METHADONE UR QL SCN: NEGATIVE
MONOCYTES # BLD AUTO: 2.17 10*3/MM3 (ref 0.1–0.9)
NEUTROPHILS # BLD AUTO: 22.49 10*3/MM3 (ref 1.7–7)
NEUTROPHILS NFR BLD MANUAL: 83 % (ref 42.7–76)
NEUTS VAC BLD QL SMEAR: ABNORMAL
NITRITE UR QL STRIP: NEGATIVE
OPIATES UR QL: NEGATIVE
OXYCODONE UR QL SCN: NEGATIVE
PH UR STRIP.AUTO: 6 [PH] (ref 5–8)
PLATELET # BLD AUTO: 469 10*3/MM3 (ref 140–450)
PMV BLD AUTO: 7.3 FL (ref 6–12)
POIKILOCYTOSIS BLD QL SMEAR: ABNORMAL
POTASSIUM SERPL-SCNC: 4.6 MMOL/L (ref 3.5–5.2)
PROCALCITONIN SERPL-MCNC: 0.11 NG/ML (ref 0–0.25)
PROT SERPL-MCNC: 8.8 G/DL (ref 6–8.5)
PROT UR QL STRIP: ABNORMAL
RBC # BLD AUTO: 4.76 10*6/MM3 (ref 4.14–5.8)
RBC # UR: ABNORMAL /HPF
REF LAB TEST METHOD: ABNORMAL
SCAN SLIDE: NORMAL
SMALL PLATELETS BLD QL SMEAR: ABNORMAL
SODIUM SERPL-SCNC: 139 MMOL/L (ref 136–145)
SP GR UR STRIP: 1.01 (ref 1–1.03)
SQUAMOUS #/AREA URNS HPF: ABNORMAL /HPF
STOMATOCYTES BLD QL SMEAR: ABNORMAL
UROBILINOGEN UR QL STRIP: ABNORMAL
WBC # BLD AUTO: 27.1 10*3/MM3 (ref 3.4–10.8)
WBC UR QL AUTO: ABNORMAL /HPF
WHOLE BLOOD HOLD SPECIMEN: NORMAL

## 2020-08-24 PROCEDURE — 85007 BL SMEAR W/DIFF WBC COUNT: CPT | Performed by: EMERGENCY MEDICINE

## 2020-08-24 PROCEDURE — 80307 DRUG TEST PRSMV CHEM ANLYZR: CPT | Performed by: EMERGENCY MEDICINE

## 2020-08-24 PROCEDURE — 36415 COLL VENOUS BLD VENIPUNCTURE: CPT

## 2020-08-24 PROCEDURE — 81001 URINALYSIS AUTO W/SCOPE: CPT | Performed by: EMERGENCY MEDICINE

## 2020-08-24 PROCEDURE — 82550 ASSAY OF CK (CPK): CPT | Performed by: EMERGENCY MEDICINE

## 2020-08-24 PROCEDURE — 99284 EMERGENCY DEPT VISIT MOD MDM: CPT

## 2020-08-24 PROCEDURE — 85025 COMPLETE CBC W/AUTO DIFF WBC: CPT | Performed by: EMERGENCY MEDICINE

## 2020-08-24 PROCEDURE — 84145 PROCALCITONIN (PCT): CPT | Performed by: EMERGENCY MEDICINE

## 2020-08-24 PROCEDURE — 80053 COMPREHEN METABOLIC PANEL: CPT | Performed by: EMERGENCY MEDICINE

## 2020-08-24 PROCEDURE — 87040 BLOOD CULTURE FOR BACTERIA: CPT | Performed by: EMERGENCY MEDICINE

## 2020-08-24 PROCEDURE — 83605 ASSAY OF LACTIC ACID: CPT

## 2020-08-24 RX ORDER — SODIUM CHLORIDE, SODIUM LACTATE, POTASSIUM CHLORIDE, CALCIUM CHLORIDE 600; 310; 30; 20 MG/100ML; MG/100ML; MG/100ML; MG/100ML
125 INJECTION, SOLUTION INTRAVENOUS CONTINUOUS
Status: DISCONTINUED | OUTPATIENT
Start: 2020-08-24 | End: 2020-08-24 | Stop reason: HOSPADM

## 2020-08-24 RX ORDER — SODIUM CHLORIDE 0.9 % (FLUSH) 0.9 %
10 SYRINGE (ML) INJECTION AS NEEDED
Status: DISCONTINUED | OUTPATIENT
Start: 2020-08-24 | End: 2020-08-24 | Stop reason: HOSPADM

## 2020-08-24 RX ADMIN — SODIUM CHLORIDE, SODIUM LACTATE, POTASSIUM CHLORIDE, AND CALCIUM CHLORIDE 1000 ML: 600; 310; 30; 20 INJECTION, SOLUTION INTRAVENOUS at 13:17
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
## 2020-08-29 LAB
BACTERIA SPEC AEROBE CULT: NORMAL
BACTERIA SPEC AEROBE CULT: NORMAL

## 2020-09-18 ENCOUNTER — HOSPITAL ENCOUNTER (OUTPATIENT)
Facility: HOSPITAL | Age: 20
Discharge: HOME | End: 2020-09-19
Attending: EMERGENCY MEDICINE | Admitting: STUDENT IN AN ORGANIZED HEALTH CARE EDUCATION/TRAINING PROGRAM

## 2020-09-18 DIAGNOSIS — S60.459A FOREIGN BODY OF FINGER: Primary | ICD-10-CM

## 2020-09-18 PROCEDURE — 99283 EMERGENCY DEPT VISIT LOW MDM: CPT

## 2020-09-19 ENCOUNTER — APPOINTMENT (OUTPATIENT)
Dept: GENERAL RADIOLOGY | Facility: HOSPITAL | Age: 20
End: 2020-09-19

## 2020-09-19 ENCOUNTER — ANESTHESIA EVENT (OUTPATIENT)
Dept: PERIOP | Facility: HOSPITAL | Age: 20
End: 2020-09-19

## 2020-09-19 ENCOUNTER — ANESTHESIA (OUTPATIENT)
Dept: PERIOP | Facility: HOSPITAL | Age: 20
End: 2020-09-19

## 2020-09-19 VITALS
HEIGHT: 73 IN | TEMPERATURE: 98 F | SYSTOLIC BLOOD PRESSURE: 109 MMHG | RESPIRATION RATE: 16 BRPM | DIASTOLIC BLOOD PRESSURE: 59 MMHG | BODY MASS INDEX: 18.55 KG/M2 | OXYGEN SATURATION: 100 % | WEIGHT: 140 LBS | HEART RATE: 79 BPM

## 2020-09-19 PROBLEM — S60.459A FOREIGN BODY OF FINGER: Status: ACTIVE | Noted: 2020-09-19

## 2020-09-19 LAB

## 2020-09-19 PROCEDURE — 25010000002 SUCCINYLCHOLINE PER 20 MG: Performed by: ANESTHESIOLOGIST ASSISTANT

## 2020-09-19 PROCEDURE — G0378 HOSPITAL OBSERVATION PER HR: HCPCS

## 2020-09-19 PROCEDURE — 25010000002 ONDANSETRON PER 1 MG: Performed by: ANESTHESIOLOGIST ASSISTANT

## 2020-09-19 PROCEDURE — 73140 X-RAY EXAM OF FINGER(S): CPT

## 2020-09-19 PROCEDURE — 25010000002 FENTANYL CITRATE (PF) 100 MCG/2ML SOLUTION: Performed by: ANESTHESIOLOGIST ASSISTANT

## 2020-09-19 PROCEDURE — 76000 FLUOROSCOPY <1 HR PHYS/QHP: CPT

## 2020-09-19 PROCEDURE — 25010000002 PROPOFOL 200 MG/20ML EMULSION: Performed by: ANESTHESIOLOGIST ASSISTANT

## 2020-09-19 PROCEDURE — 0202U NFCT DS 22 TRGT SARS-COV-2: CPT | Performed by: ORTHOPAEDIC SURGERY

## 2020-09-19 RX ORDER — ACETAMINOPHEN 325 MG/1
650 TABLET ORAL ONCE AS NEEDED
Status: DISCONTINUED | OUTPATIENT
Start: 2020-09-19 | End: 2020-09-19 | Stop reason: HOSPADM

## 2020-09-19 RX ORDER — SODIUM CHLORIDE 0.9 % (FLUSH) 0.9 %
3-10 SYRINGE (ML) INJECTION AS NEEDED
Status: DISCONTINUED | OUTPATIENT
Start: 2020-09-19 | End: 2020-09-19 | Stop reason: HOSPADM

## 2020-09-19 RX ORDER — SUCCINYLCHOLINE CHLORIDE 20 MG/ML
INJECTION INTRAMUSCULAR; INTRAVENOUS AS NEEDED
Status: DISCONTINUED | OUTPATIENT
Start: 2020-09-19 | End: 2020-09-19 | Stop reason: SURG

## 2020-09-19 RX ORDER — MEPERIDINE HYDROCHLORIDE 25 MG/ML
12.5 INJECTION INTRAMUSCULAR; INTRAVENOUS; SUBCUTANEOUS
Status: DISCONTINUED | OUTPATIENT
Start: 2020-09-19 | End: 2020-09-19 | Stop reason: HOSPADM

## 2020-09-19 RX ORDER — FENTANYL CITRATE 50 UG/ML
50 INJECTION, SOLUTION INTRAMUSCULAR; INTRAVENOUS
Status: DISCONTINUED | OUTPATIENT
Start: 2020-09-19 | End: 2020-09-19 | Stop reason: HOSPADM

## 2020-09-19 RX ORDER — FLUMAZENIL 0.1 MG/ML
0.1 INJECTION INTRAVENOUS AS NEEDED
Status: DISCONTINUED | OUTPATIENT
Start: 2020-09-19 | End: 2020-09-19 | Stop reason: HOSPADM

## 2020-09-19 RX ORDER — FENTANYL CITRATE 50 UG/ML
INJECTION, SOLUTION INTRAMUSCULAR; INTRAVENOUS AS NEEDED
Status: DISCONTINUED | OUTPATIENT
Start: 2020-09-19 | End: 2020-09-19 | Stop reason: SURG

## 2020-09-19 RX ORDER — SODIUM CHLORIDE, SODIUM LACTATE, POTASSIUM CHLORIDE, CALCIUM CHLORIDE 600; 310; 30; 20 MG/100ML; MG/100ML; MG/100ML; MG/100ML
150 INJECTION, SOLUTION INTRAVENOUS CONTINUOUS
Status: DISCONTINUED | OUTPATIENT
Start: 2020-09-19 | End: 2020-09-19 | Stop reason: HOSPADM

## 2020-09-19 RX ORDER — ONDANSETRON 2 MG/ML
4 INJECTION INTRAMUSCULAR; INTRAVENOUS ONCE AS NEEDED
Status: DISCONTINUED | OUTPATIENT
Start: 2020-09-19 | End: 2020-09-19 | Stop reason: HOSPADM

## 2020-09-19 RX ORDER — PHENYLEPHRINE HCL IN 0.9% NACL 0.5 MG/5ML
SYRINGE (ML) INTRAVENOUS AS NEEDED
Status: DISCONTINUED | OUTPATIENT
Start: 2020-09-19 | End: 2020-09-19 | Stop reason: SURG

## 2020-09-19 RX ORDER — DIPHENHYDRAMINE HYDROCHLORIDE 50 MG/ML
12.5 INJECTION INTRAMUSCULAR; INTRAVENOUS
Status: DISCONTINUED | OUTPATIENT
Start: 2020-09-19 | End: 2020-09-19 | Stop reason: HOSPADM

## 2020-09-19 RX ORDER — IPRATROPIUM BROMIDE AND ALBUTEROL SULFATE 2.5; .5 MG/3ML; MG/3ML
3 SOLUTION RESPIRATORY (INHALATION) ONCE AS NEEDED
Status: DISCONTINUED | OUTPATIENT
Start: 2020-09-19 | End: 2020-09-19 | Stop reason: HOSPADM

## 2020-09-19 RX ORDER — LABETALOL HYDROCHLORIDE 5 MG/ML
5 INJECTION, SOLUTION INTRAVENOUS
Status: DISCONTINUED | OUTPATIENT
Start: 2020-09-19 | End: 2020-09-19 | Stop reason: HOSPADM

## 2020-09-19 RX ORDER — ACETAMINOPHEN 650 MG/1
650 SUPPOSITORY RECTAL ONCE AS NEEDED
Status: DISCONTINUED | OUTPATIENT
Start: 2020-09-19 | End: 2020-09-19 | Stop reason: HOSPADM

## 2020-09-19 RX ORDER — SODIUM CHLORIDE 0.9 % (FLUSH) 0.9 %
3 SYRINGE (ML) INJECTION EVERY 12 HOURS SCHEDULED
Status: DISCONTINUED | OUTPATIENT
Start: 2020-09-19 | End: 2020-09-19 | Stop reason: HOSPADM

## 2020-09-19 RX ORDER — PROPOFOL 10 MG/ML
INJECTION, EMULSION INTRAVENOUS AS NEEDED
Status: DISCONTINUED | OUTPATIENT
Start: 2020-09-19 | End: 2020-09-19 | Stop reason: SURG

## 2020-09-19 RX ORDER — NALOXONE HCL 0.4 MG/ML
0.4 VIAL (ML) INJECTION AS NEEDED
Status: DISCONTINUED | OUTPATIENT
Start: 2020-09-19 | End: 2020-09-19 | Stop reason: HOSPADM

## 2020-09-19 RX ORDER — ONDANSETRON 2 MG/ML
INJECTION INTRAMUSCULAR; INTRAVENOUS AS NEEDED
Status: DISCONTINUED | OUTPATIENT
Start: 2020-09-19 | End: 2020-09-19 | Stop reason: SURG

## 2020-09-19 RX ORDER — SODIUM CHLORIDE, SODIUM LACTATE, POTASSIUM CHLORIDE, CALCIUM CHLORIDE 600; 310; 30; 20 MG/100ML; MG/100ML; MG/100ML; MG/100ML
9 INJECTION, SOLUTION INTRAVENOUS CONTINUOUS PRN
Status: DISCONTINUED | OUTPATIENT
Start: 2020-09-19 | End: 2020-09-19 | Stop reason: HOSPADM

## 2020-09-19 RX ORDER — MIDAZOLAM HYDROCHLORIDE 1 MG/ML
1 INJECTION INTRAMUSCULAR; INTRAVENOUS
Status: DISCONTINUED | OUTPATIENT
Start: 2020-09-19 | End: 2020-09-19 | Stop reason: HOSPADM

## 2020-09-19 RX ADMIN — SODIUM CHLORIDE, SODIUM LACTATE, POTASSIUM CHLORIDE, AND CALCIUM CHLORIDE 150 ML/HR: 600; 310; 30; 20 INJECTION, SOLUTION INTRAVENOUS at 00:41

## 2020-09-19 RX ADMIN — PROPOFOL 100 MG: 10 INJECTION, EMULSION INTRAVENOUS at 08:36

## 2020-09-19 RX ADMIN — PHENYLEPHRINE HYDROCHLORIDE 100 MCG: 10 INJECTION INTRAVENOUS at 08:27

## 2020-09-19 RX ADMIN — FENTANYL CITRATE 50 MCG: 50 INJECTION, SOLUTION INTRAMUSCULAR; INTRAVENOUS at 08:07

## 2020-09-19 RX ADMIN — FENTANYL CITRATE 50 MCG: 50 INJECTION, SOLUTION INTRAMUSCULAR; INTRAVENOUS at 08:19

## 2020-09-19 RX ADMIN — PROPOFOL 200 MG: 10 INJECTION, EMULSION INTRAVENOUS at 08:07

## 2020-09-19 RX ADMIN — SUCCINYLCHOLINE CHLORIDE 140 MG: 20 INJECTION, SOLUTION INTRAMUSCULAR; INTRAVENOUS at 08:07

## 2020-09-19 RX ADMIN — ONDANSETRON 4 MG: 2 INJECTION INTRAMUSCULAR; INTRAVENOUS at 08:16

## 2020-09-19 RX ADMIN — PHENYLEPHRINE HYDROCHLORIDE 150 MCG: 10 INJECTION INTRAVENOUS at 08:31

## 2020-09-19 RX ADMIN — SODIUM CHLORIDE, SODIUM LACTATE, POTASSIUM CHLORIDE, AND CALCIUM CHLORIDE: .6; .31; .03; .02 INJECTION, SOLUTION INTRAVENOUS at 08:02

## 2020-09-19 NOTE — ED NOTES
Cleansed 1st digit, Right hand with microklenz and NS. Wrapped with 2 inch Héctor Lincoln  09/19/20 003

## 2020-09-19 NOTE — ED NOTES
During my assessment of home medications and medical hx pt admits to the daily use of meth, benzodiazepines and marijuana.      Yen Virgen RN  09/19/20 0058

## 2020-09-19 NOTE — DISCHARGE INSTRUCTIONS
Orthopaedic & Hand Surgery  Ring Removal Discharge Instructions  Dr. Franky Nunn  [phone]    • INCISION CARE  o Wash your hands prior to dressing changes  o The finger may be redressed with antibiotic ointment and dry gauze.  • ACTIVITIES  - Okay to use the finger for normal activities.  No restrictions.  - Okay to shower and immerse the finger, changing the dressing afterwards until all abrasion has resolved.    • Restrictions  o Weight: No weightbearing restrictions.    • Pain Control  o Ice:  - Ice is an excellent pain reliever. This can be used regardless of whether or not you are taking pain medication.  - Apply an ice pack to the surgical area for 20 minutes at a time, removing it for at least an hour to prevent frostbite.  - You should keep a towel between any dressings on the ice pack to prevent them from getting damp and from developing frostbite on the operative site.  o Elevation:  - Elevation is another easy way to control pain after surgery. Whenever possible, keep the operative limb elevated above the level of your heart to reduce swelling.  o Acetaminophen (Tylenol):  - CLASSIFICATION: A non-narcotic medication that is available without a prescription.  • Acetaminophen controls pain but does not affect swelling or inflammation.  - DRIVING: Acetaminophen will not impair your ability to drive. It is safe to drive while taking if your physical condition does not limit you.  - POTENTIAL SIDE EFFECTS: nausea, stomach upset, liver failure if taken in large doses.  - Interactions: Some narcotic medications contain acetaminophen. If you have a narcotic prescription, make sure to cut back on the acetaminophen if you are taking the narcotic. You should never take more 3000 mg of acetaminophen in one 24-hour period.  - DOSAGE:  • Following surgery, you may take two regular strength (325 mg) tabs to control pain every 6 hours. This can be taken with NSAIDs (see below) or alternating the two.  • After  the initial surgical pain begins to resolve, you may begin to decrease the pain medication. By the end of a few weeks, you should be off of pain medications.  o NSAIDS: This includes aspirin, ibuprofen, naproxen, Motrin, Aleve, Mobic, Celebrex  - CLASSIFICATION: These are non-narcotic medications that are available without a prescription.  • They are particularly effective at reducing swelling and inflammation  - DRIVING: These medications will not impair your ability to drive. It is safe to drive while taking these medications if your physical condition does not limit you.  - POTENTIAL SIDE EFFECTS: nausea, stomach upset, ulcer, gastric bleeding, kidney failure.  - DOSAGE:  • Following surgery, you may take ibuprofen (Motrin) 600 mg to control pain every 6 hours with food. It helps to take it scheduled (around the clock) to allow it to help reduce swelling.  • After the initial surgical pain begins to resolve, you may begin to decrease the pain medication. By the end of a few weeks, you should be off of pain medications.    • FOLLOW-UP VISITS  o You will need to follow up in the office with your surgeon in 1-2 weeks, or as instructed elsewhere in your discharge paperwork. Please call this number [phone] to schedule this appointment if one has not already been made.  o If you have any concerns or suspected complications prior to your follow up visit, please call the office. Do not wait until your appointment time if you suspect complications. These will need to be addressed in the office promptly.      Franky Nunn MD, PhD  Orthopaedic Surgery  [facility]

## 2020-09-19 NOTE — DISCHARGE SUMMARY
Orthopaedic & Hand Surgery  Discharge Summary  Dr. Franky Nunn  [phone]      NAME: Leonel DUMONT jules PCP: Provider, No Known   :  MRN: [id number]  [id number] LOS:  ADMIT: 0 days  2020   AGE/SEX: 20 y.o. male DC:  today             · Admitting Diagnosis: Foreign body of finger [S60.459A]    Surgery Performed: Constrictive ring removal from right index finger and left ring finger    · Discharge Medications:     Nonnarcotic pain medication is recommended for pain control.    · Vitals:     Vitals:    20 0855 20 0900 20 0905 20 0920   BP: 95/45 95/43 (!) 102/38 (!) 103/37   BP Location:       Patient Position:       Pulse: 73 68 66 75   Resp: 17      Temp:       TempSrc:       SpO2: 98% 97% 97% 94%   Weight:       Height:           · Labs:      Admission on 2020   Component Date Value Ref Range Status   • ADENOVIRUS, PCR 2020 Not Detected  Not Detected Final   • Coronavirus 229E 2020 Not Detected  Not Detected Final   • Coronavirus HKU1 2020 Not Detected  Not Detected Final   • Coronavirus  Not Detected  Not Detected Final   • Coronavirus  Not Detected  Not Detected Final   • COVID19 2020 Not Detected  Not Detected - Ref. Range Final   • Human Metapneumovirus 2020 Not Detected  Not Detected Final   • Human Rhinovirus/Enterovirus 2020 Not Detected  Not Detected Final   • Influenza A PCR 2020 Not Detected  Not Detected Final   • Influenza A H1 2020 Not Detected  Not Detected Final   • Influenza A H1 2009 PCR 2020 Not Detected  Not Detected Final   • Influenza A H3 2020 Not Detected  Not Detected Final   • Influenza B PCR 2020 Not Detected  Not Detected Final   • Parainfluenza Virus 1 2020 Not Detected  Not Detected Final   • Parainfluenza Virus 2 2020 Not Detected  Not Detected Final   • Parainfluenza Virus 3 2020 Not Detected  Not Detected Final   • Parainfluenza  Virus 4 09/19/2020 Not Detected  Not Detected Final   • RSV, PCR 09/19/2020 Not Detected  Not Detected Final   • Bordetella pertussis pcr 09/19/2020 Not Detected  Not Detected Final   • Bordetella parapertussis PCR 09/19/2020 Not Detected  Not Detected Final   • Chlamydophila pneumoniae PCR 09/19/2020 Not Detected  Not Detected Final   • Mycoplasma pneumo by PCR 09/19/2020 Not Detected  Not Detected Final     · Hospital Course:   20 y.o. male was admitted to [facility] to services of Timothy Villanueva II, MD with Foreign body of finger [S60.459A] on 9/18/2020 and underwent removal of constrictive rings from the right index and left ring fingers by Dr. Franky Nunn.  Post-operatively the patient transferred to the floor for hemodynamic monitoring. Vital signs and laboratory values are now within safe parameters for discharge. The dressings and/or incision is intact without signs or symptoms of active infection. Operative extremity neurovascular status remains intact as compared to the preoperative exam. Appropriate education re: incision care, activity levels, medications, and follow up visits was completed and all questions were answered. The patient is now deemed stable for discharge.    HOME: The patient progressed well with physical therapy. There were cleared for discharge to home. The patietn was sent home in good condition}.     Franky Nunn MD, PhD  Orthopaedic & Hand Surgery  [facility]  [phone] - [place] Office  [phone] - [place] Office

## 2020-09-19 NOTE — ANESTHESIA POSTPROCEDURE EVALUATION
Patient: Leonel Garg    Procedure Summary     Date: 09/19/20 Room / Location: [facility] OR 09 / [facility] MAIN OR    Anesthesia Start: 0802 Anesthesia Stop: 0854    Procedure: Right index finger constrictive ring removal (Bilateral Hand) Diagnosis:       Foreign body of finger      (Foreign body of finger [S60.459A])    Surgeon: Franky Nunn MD Provider: Agapito Ureña MD    Anesthesia Type: general ASA Status: 3          Anesthesia Type: general    Vitals  Vitals Value Taken Time   BP 96/58 09/19/20 0948   Temp 97.5 °F (36.4 °C) 09/19/20 0948   Pulse 51 09/19/20 0949   Resp 16 09/19/20 0948   SpO2 98 % 09/19/20 0949   Vitals shown include unvalidated device data.        Post Anesthesia Care and Evaluation    Patient location during evaluation: PACU  Patient participation: complete - patient participated  Level of consciousness: awake  Pain scale: See nurse's notes for pain score.  Pain management: adequate  Airway patency: patent  Anesthetic complications: No anesthetic complications  PONV Status: none  Cardiovascular status: acceptable  Respiratory status: acceptable  Hydration status: acceptable    Comments: Patient seen and examined postoperatively; vital signs stable; SpO2 greater than or equal to 90%; cardiopulmonary status stable; nausea/vomiting adequately controlled; pain adequately controlled; no apparent anesthesia complications; patient discharged from anesthesia care when discharge criteria were met

## 2020-09-19 NOTE — ED PROVIDER NOTES
Subjective   History of Present Illness  Patient states that he has a ring on his right index finger and thinks that he may have hit his finger earlier today and then developed swelling and now is unable to get the ring off.  The patient is a poor or hesitant historian.  Review of Systems    No past medical history on file.    Allergies   Allergen Reactions   • Penicillins Unknown - High Severity       No past surgical history on file.    No family history on file.    Social History     Socioeconomic History   • Marital status: Single     Spouse name: Not on file   • Number of children: Not on file   • Years of education: Not on file   • Highest education level: Not on file   Tobacco Use   • Smoking status: Current Every Day Smoker     Packs/day: 1.00     Years: 4.00     Pack years: 4.00   • Smokeless tobacco: Never Used   Substance and Sexual Activity   • Alcohol use: Yes   • Drug use: Yes     Types: Marijuana     Comment: denies meth use even with positive drug screen    • Sexual activity: Defer           Objective   Physical Exam  The patient has a ring in place over the proximal phalanx of the right index finger and there is swelling and erythema distal to this.  This prevents the ring from sliding over the PIP joint.  Procedures           ED Course      The patient had umbilical tape of the small with wrapped around the finger to try to decrease the swelling and then I tried to slide the ring but there is too much swelling at the PIP joint.  I did a metacarpal block with 4 cc of 2% Xylocaine and again taped the finger but was not able to slide the ring over the swelling.  I had multiple attempts with various tools to try to cut the ring but was unsuccessful.  I am not sure what type of material the ring is made up and neither does the patient.  After approximately an hour of trying various methods I decided that the best idea was to just have him go to the OR with orthopedics and have adequate anesthesia and  remove the ring without causing any further damage to the skin.                                     MDM  See the conversation above  Final diagnoses:   Foreign body of finger            Jun Harley MD  09/19/20 0014

## 2020-09-19 NOTE — OP NOTE
Orthopaedic & Hand Surgery  Constrictive ring removal operative report  Dr. Franky Nunn  [phone]    PATIENT NAME: Leonel Garg  MRN: [id number]  : 2000 AGE: 20 y.o. GENDER: male  DATE OF OPERATION: 2020  PREOPERATIVE DIAGNOSIS:   • Constrictive rings of the right index finger x1 and left ring finger x2  POSTOPERATIVE DIAGNOSIS:   • Constrictive rings of the right index finger x1 and left ring finger x2  OPERATION PERFORMED:   • Removal of constrictive rings from right index finger and left ring finger (3 in total)  SURGEON: Franky Nunn MD, PhD  ANESTHESIA: General  ASSISTANT: None  ESTIMATED BLOOD LOSS: None  SPECIMENS: None  SPONGE AND NEEDLE COUNT: Correct  INDICATIONS: This patient is noted to have rings to the operative fingers that were constrictive and were unable to be removed in the emergency room despite multiple attempts with umbilical tape and ring cutters.  Orthopedic surgery was consulted for further evaluation and management.  Discussed that patient would require full sedation to be able to tolerate ring removal. The risks of surgery were discussed and included Pain, Bleeding, Infection, Complications of anesthesia, Damage to blood vessels, nerves and other surrounding structures, Incomplete resolution or recurrence of the condition, Further procedures and Unforeseen risks of surgery including stroke, heart stoppage or death. No guarantees were made. Following a thorough discussion, questions were solicited and answered to the his satisfaction. Verbal and written consent were obtained prior to proceeding with surgery.    PERTINENT FINDINGS: Mild abrasion to the right index finger that was immediately distal to constricting ring.    DETAILS OF PROCEDURE:  The patient was met in the preoperative area. The site was marked. The consent and H&P were reviewed. The patient was then transferred to the operative suite and placed supine on the operative table. The patient  submitted to anesthesia.     A surgical timeout was taken to verify the patient's identity, the surgical side/site, planned procedure.  No antibiotics were indicated.    We started on the left ring finger.  The finger was initially wrapped with umbilical tape providing compression from distal to proximal.  This was threaded underneath the first of 2 constrictive rings and was slowly unwound.  Further advancement past PIP joint was not possible due to redundant skin, and so K-Y jelly was used to lubricate the finger.  At this point, the ring could then be advanced past the PIP joint and off the finger.  The second ring was then removed in a similar manner using K-Y jelly.    We then turned our attention to the right index finger, see image below before and after.  This was a significantly more constricting ring, and the one that he presented to the emergency department for.  Attempts with umbilical tape and K-Y jelly proved unsuccessful.  We then used a ring cutter edge of a Kendy scissors which was similarly unsuccessful.  In fact, attempts at cutting the ring broke the scissors.  We were able to slide a freer underneath the ring, and so we opened a pneumatic drill system and attached a carbide drill bit to this.  Under constant irrigation to keep the site cool, we were able to cut through the ring using this drill bit.  This was done on 2 sites 180 degrees from each other.  At this point, the ring was able to be removed.      The finger was inspected and noted to have metallic flakes in the previous area of abrasion.  These were debrided with sharp edge of a [place] elevator as they were not able to wash off with irrigation alone.  Following this, repeat inspection revealed no further evidence of metallic foreign bodies in the finger.    C arm imaging was obtained of the right index finger and demonstrated no evidence of fractures or dislocations on the AP or lateral views.    The finger was dressed with  antibiotic ointment, Xeroform and Eugenia wrap.    Anesthesia was reversed without complication, the patient was transferred to the [facility] and taken to the recovery room in stable condition. Sponge and needle count were reported to me as correct. There were no complications. The patient tolerated the procedure well.    Images:              Post Operative Plan:   • Pain may be controlled with nonnarcotic pain medication  • Patient should keep the finger elevated to help reduce swelling  • Patient may be discharged to his previous accommodation and follow-up with me in 10 days.    Franky Nunn MD, PhD  Orthopaedic & Hand Surgery  [facility]  [phone] - [place] Office  [phone] - [facility]

## 2020-09-19 NOTE — ED NOTES
Pt states this morning he removed his ring without issue then he was moving furniture and smashed his finger. After the injury he noticed swelling and pain to the knuckle so he tried to remove the ring. He attempted to use soap and water to get the ring off but was unable to get it past the swollen knuckle.       Yen Virgen RN  09/18/20 5913

## 2020-09-19 NOTE — H&P
"  Orthopaedic & Hand Surgery  Constrictive Ring History and Physical  Dr. Franky Nunn  [phone]    CC: ring stuck on right indx finger    HPI:  Patient is a 20 y.o.  male who presents with ring stuck on right index finger. Notes he hit the finger earlier yesterday and the PIPJ began to swell. Attempted to remove the ring himself but was unable. Presented to the ED, where attempts to remove by ED staff were unsuccessful. Ortho consulted for further evaluation and management. Pt notes rings on left ring finger are also challenging to remove. Requests removal while asleep.    MEDICAL HISTORY  · No past medical history on file.  · No past surgical history on file.  Prior to Admission medications    Not on File   ·   Allergies   Allergen Reactions   • Penicillins Unknown - High Severity   ·   Most Recent Immunizations   Administered Date(s) Administered   • Tdap 07/21/2020   ·   Social History     Tobacco Use   • Smoking status: Current Every Day Smoker     Packs/day: 1.00     Years: 4.00     Pack years: 4.00   • Smokeless tobacco: Never Used   Substance Use Topics   • Alcohol use: Yes   ·    Social History     Substance and Sexual Activity   Drug Use Yes   • Types: Marijuana    Comment: denies meth use even with positive drug screen    ·     REVIEW OF SYSTEMS:  · Neurological: Positive for numbness or paresthesias      VITALS: /64 (BP Location: Right arm, Patient Position: Lying)   Pulse 92   Temp 98.1 °F (36.7 °C) (Oral)   Resp 18   Ht 185.4 cm (73\")   Wt 63.5 kg (140 lb)   SpO2 99%   BMI 18.47 kg/m²  Body mass index is 18.47 kg/m².    PHYSICAL EXAM:   · CONSTITUTIONAL: No acute distress  · LUNGS: Equal chest rise, no shortness of air  · CARDIOVASCULAR: palpable peripheral pulses  · ABDOMEN: Benign. Soft. No Guarding  · SKIN: skin is swollen at the right index PIPJ and by ring and erythematous distal  · EXTREMITY: Right Upper Extremity  · Tenderness to Palpation: Tenderness to palpation at the " PIPJ of index finger  · Gross Deformity: No Gross Deformity  · Pulses:  Brisk Capillary Refill distal to ring  · Sensation: intact in finger tip but with paresthesias  · Motor: not able to flex and extend finger due to pain  · LUE: Rings on left ring finger that are also unable to be removed.    RADIOLOGY REVIEW:   No radiology results for the last 7 days    LABS:   Results for the past 24 hours:   Recent Results (from the past 24 hour(s))   Respiratory Panel PCR w/COVID-19(SARS-CoV-2) LISSET/BENJI/SANDRA/PAD/COR/MAD In-House, NP Swab in UTM/VTM, 3-4 HR TAT - Swab, Nasopharynx    Collection Time: 09/19/20  4:58 AM    Specimen: Nasopharynx; Swab   Result Value Ref Range    ADENOVIRUS, PCR Not Detected Not Detected    Coronavirus 229E Not Detected Not Detected    Coronavirus HKU1 Not Detected Not Detected    Coronavirus NL63 Not Detected Not Detected    Coronavirus OC43 Not Detected Not Detected    COVID19 Not Detected Not Detected - Ref. Range    Human Metapneumovirus Not Detected Not Detected    Human Rhinovirus/Enterovirus Not Detected Not Detected    Influenza A PCR Not Detected Not Detected    Influenza A H1 Not Detected Not Detected    Influenza A H1 2009 PCR Not Detected Not Detected    Influenza A H3 Not Detected Not Detected    Influenza B PCR Not Detected Not Detected    Parainfluenza Virus 1 Not Detected Not Detected    Parainfluenza Virus 2 Not Detected Not Detected    Parainfluenza Virus 3 Not Detected Not Detected    Parainfluenza Virus 4 Not Detected Not Detected    RSV, PCR Not Detected Not Detected    Bordetella pertussis pcr Not Detected Not Detected    Bordetella parapertussis PCR Not Detected Not Detected    Chlamydophila pneumoniae PCR Not Detected Not Detected    Mycoplasma pneumo by PCR Not Detected Not Detected       IMPRESSION:  Patient is a 20 y.o.  male with constrictive rings on right index and left ring finger. Unable to be removed with patient awake    PLAN:   · Admited to: Timothy Duckworth  Rich CASTRO MD   · Dr. Nunn Consulting for hand surgey  · Diet: NPO Now  · Imaging: will obtain of right hand once asleep  · Surgery: removal of constrictive rings to right index and left ring fingers.  · Consent: The risks and benefits of operative versus nonoperative treatment were discussed.  The patient elected to undergo operative treatment of their injury.  The risks discussed included but were not limited to blood clots, MI, stroke, other medical complications, infection, damage to neurovascular structures, the need for further procedures, and risk of anesthesia..  No guarantees were made   · Disposition: Acute. Plan for OR today    Franky Nunn MD, PhD  Orthopaedic & Hand Surgery  [facility]  [phone] - [place] [facility]  [phone] - [facility]

## 2020-09-19 NOTE — PLAN OF CARE
Goal Outcome Evaluation:   Patient appears restless but has slept off and on since arriving on floor. Patient on room air. LR infusing at 150ml/hr. Covid test obtained.

## 2020-09-19 NOTE — ANESTHESIA PREPROCEDURE EVALUATION
Anesthesia Evaluation     Patient summary reviewed and Nursing notes reviewed   no history of anesthetic complications:  NPO Solid Status: > 8 hours  NPO Liquid Status: > 8 hours           Airway   Mallampati: II  TM distance: >3 FB  Neck ROM: full  No difficulty expected  Dental      Pulmonary - normal exam   (+) a smoker Current Abstained day of surgery,   Cardiovascular - negative cardio ROS and normal exam        Neuro/Psych- negative ROS  GI/Hepatic/Renal/Endo - negative ROS     Musculoskeletal (-) negative ROS    Abdominal  - normal exam   Substance History   (+) drug use (MJ and Meth use)     OB/GYN negative ob/gyn ROS         Other - negative ROS                       Anesthesia Plan    ASA 3     general     intravenous induction     Anesthetic plan, all risks, benefits, and alternatives have been provided, discussed and informed consent has been obtained with: patient.    Plan discussed with CAA.

## 2020-09-21 NOTE — PROGRESS NOTES
Case Management Discharge Note                Selected Continued Care - Discharged on 9/19/2020 Admission date: 9/18/2020 - Discharge disposition: Home or Self Care                 Final Discharge Disposition Code: 01 - home or self-care

## 2024-10-02 ENCOUNTER — APPOINTMENT (OUTPATIENT)
Dept: CT IMAGING | Facility: HOSPITAL | Age: 24
End: 2024-10-02
Payer: MEDICAID

## 2024-10-02 ENCOUNTER — HOSPITAL ENCOUNTER (OUTPATIENT)
Facility: HOSPITAL | Age: 24
Setting detail: OBSERVATION
Discharge: HOME | End: 2024-10-04
Attending: INTERNAL MEDICINE | Admitting: INTERNAL MEDICINE
Payer: MEDICAID

## 2024-10-02 DIAGNOSIS — N13.1 HYDRONEPHROSIS DUE TO OBSTRUCTION OF URETER: ICD-10-CM

## 2024-10-02 DIAGNOSIS — N20.0 KIDNEY STONE ON RIGHT SIDE: Primary | ICD-10-CM

## 2024-10-02 DIAGNOSIS — N13.2 HYDRONEPHROSIS WITH URETERAL CALCULUS: ICD-10-CM

## 2024-10-02 LAB
ABO GROUP BLD: NORMAL
ALBUMIN SERPL-MCNC: 5 G/DL (ref 3.5–5.2)
ALBUMIN/GLOB SERPL: 1.6 G/DL
ALP SERPL-CCNC: 72 U/L (ref 39–117)
ALT SERPL W P-5'-P-CCNC: 21 U/L (ref 1–41)
ANION GAP SERPL CALCULATED.3IONS-SCNC: 10.9 MMOL/L (ref 5–15)
ANION GAP SERPL CALCULATED.3IONS-SCNC: 12.5 MMOL/L (ref 5–15)
APTT PPP: 28.1 SECONDS (ref 24–31)
AST SERPL-CCNC: 26 U/L (ref 1–40)
BACTERIA UR QL AUTO: ABNORMAL /HPF
BASOPHILS # BLD AUTO: 0.03 10*3/MM3 (ref 0–0.2)
BASOPHILS # BLD AUTO: 0.04 10*3/MM3 (ref 0–0.2)
BASOPHILS NFR BLD AUTO: 0.2 % (ref 0–1.5)
BASOPHILS NFR BLD AUTO: 0.3 % (ref 0–1.5)
BILIRUB SERPL-MCNC: 0.7 MG/DL (ref 0–1.2)
BILIRUB UR QL STRIP: NEGATIVE
BLD GP AB SCN SERPL QL: NEGATIVE
BUN SERPL-MCNC: 12 MG/DL (ref 6–20)
BUN SERPL-MCNC: 9 MG/DL (ref 6–20)
BUN/CREAT SERPL: 10.9 (ref 7–25)
BUN/CREAT SERPL: 8.1 (ref 7–25)
CALCIUM SPEC-SCNC: 10.6 MG/DL (ref 8.6–10.5)
CALCIUM SPEC-SCNC: 9.3 MG/DL (ref 8.6–10.5)
CHLORIDE SERPL-SCNC: 103 MMOL/L (ref 98–107)
CHLORIDE SERPL-SCNC: 103 MMOL/L (ref 98–107)
CLARITY UR: CLEAR
CO2 SERPL-SCNC: 23.5 MMOL/L (ref 22–29)
CO2 SERPL-SCNC: 25.1 MMOL/L (ref 22–29)
COLOR UR: YELLOW
CREAT SERPL-MCNC: 1.1 MG/DL (ref 0.76–1.27)
CREAT SERPL-MCNC: 1.11 MG/DL (ref 0.76–1.27)
DEPRECATED RDW RBC AUTO: 35.8 FL (ref 37–54)
DEPRECATED RDW RBC AUTO: 36.2 FL (ref 37–54)
EGFRCR SERPLBLD CKD-EPI 2021: 94.5 ML/MIN/1.73
EGFRCR SERPLBLD CKD-EPI 2021: 95.5 ML/MIN/1.73
EOSINOPHIL # BLD AUTO: 0.03 10*3/MM3 (ref 0–0.4)
EOSINOPHIL # BLD AUTO: 0.08 10*3/MM3 (ref 0–0.4)
EOSINOPHIL NFR BLD AUTO: 0.2 % (ref 0.3–6.2)
EOSINOPHIL NFR BLD AUTO: 0.5 % (ref 0.3–6.2)
ERYTHROCYTE [DISTWIDTH] IN BLOOD BY AUTOMATED COUNT: 11.1 % (ref 12.3–15.4)
ERYTHROCYTE [DISTWIDTH] IN BLOOD BY AUTOMATED COUNT: 11.2 % (ref 12.3–15.4)
GLOBULIN UR ELPH-MCNC: 3.1 GM/DL
GLUCOSE SERPL-MCNC: 133 MG/DL (ref 65–99)
GLUCOSE SERPL-MCNC: 98 MG/DL (ref 65–99)
GLUCOSE UR STRIP-MCNC: NEGATIVE MG/DL
HCT VFR BLD AUTO: 36.6 % (ref 37.5–51)
HCT VFR BLD AUTO: 41.5 % (ref 37.5–51)
HGB BLD-MCNC: 13 G/DL (ref 13–17.7)
HGB BLD-MCNC: 14.9 G/DL (ref 13–17.7)
HGB UR QL STRIP.AUTO: ABNORMAL
HOLD SPECIMEN: NORMAL
HOLD SPECIMEN: NORMAL
HYALINE CASTS UR QL AUTO: ABNORMAL /LPF
IMM GRANULOCYTES # BLD AUTO: 0.04 10*3/MM3 (ref 0–0.05)
IMM GRANULOCYTES # BLD AUTO: 0.05 10*3/MM3 (ref 0–0.05)
IMM GRANULOCYTES NFR BLD AUTO: 0.3 % (ref 0–0.5)
IMM GRANULOCYTES NFR BLD AUTO: 0.3 % (ref 0–0.5)
INR PPP: 1.01 (ref 0.93–1.1)
KETONES UR QL STRIP: ABNORMAL
LEUKOCYTE ESTERASE UR QL STRIP.AUTO: NEGATIVE
LYMPHOCYTES # BLD AUTO: 1.8 10*3/MM3 (ref 0.7–3.1)
LYMPHOCYTES # BLD AUTO: 1.95 10*3/MM3 (ref 0.7–3.1)
LYMPHOCYTES NFR BLD AUTO: 13.1 % (ref 19.6–45.3)
LYMPHOCYTES NFR BLD AUTO: 13.6 % (ref 19.6–45.3)
MCH RBC QN AUTO: 31.8 PG (ref 26.6–33)
MCH RBC QN AUTO: 32 PG (ref 26.6–33)
MCHC RBC AUTO-ENTMCNC: 35.5 G/DL (ref 31.5–35.7)
MCHC RBC AUTO-ENTMCNC: 35.9 G/DL (ref 31.5–35.7)
MCV RBC AUTO: 89.1 FL (ref 79–97)
MCV RBC AUTO: 89.5 FL (ref 79–97)
MONOCYTES # BLD AUTO: 1.16 10*3/MM3 (ref 0.1–0.9)
MONOCYTES # BLD AUTO: 1.38 10*3/MM3 (ref 0.1–0.9)
MONOCYTES NFR BLD AUTO: 8.8 % (ref 5–12)
MONOCYTES NFR BLD AUTO: 9.3 % (ref 5–12)
NEUTROPHILS NFR BLD AUTO: 10.17 10*3/MM3 (ref 1.7–7)
NEUTROPHILS NFR BLD AUTO: 11.41 10*3/MM3 (ref 1.7–7)
NEUTROPHILS NFR BLD AUTO: 76.5 % (ref 42.7–76)
NEUTROPHILS NFR BLD AUTO: 76.9 % (ref 42.7–76)
NITRITE UR QL STRIP: NEGATIVE
NRBC BLD AUTO-RTO: 0 /100 WBC (ref 0–0.2)
NRBC BLD AUTO-RTO: 0 /100 WBC (ref 0–0.2)
PH UR STRIP.AUTO: 5.5 [PH] (ref 5–8)
PLATELET # BLD AUTO: 319 10*3/MM3 (ref 140–450)
PLATELET # BLD AUTO: 396 10*3/MM3 (ref 140–450)
PMV BLD AUTO: 9.6 FL (ref 6–12)
PMV BLD AUTO: 9.7 FL (ref 6–12)
POTASSIUM SERPL-SCNC: 3.7 MMOL/L (ref 3.5–5.2)
POTASSIUM SERPL-SCNC: 4.1 MMOL/L (ref 3.5–5.2)
PROT SERPL-MCNC: 8.1 G/DL (ref 6–8.5)
PROT UR QL STRIP: NEGATIVE
PROTHROMBIN TIME: 11 SECONDS (ref 9.6–11.7)
RBC # BLD AUTO: 4.09 10*6/MM3 (ref 4.14–5.8)
RBC # BLD AUTO: 4.66 10*6/MM3 (ref 4.14–5.8)
RBC # UR STRIP: ABNORMAL /HPF
REF LAB TEST METHOD: ABNORMAL
RH BLD: POSITIVE
SODIUM SERPL-SCNC: 139 MMOL/L (ref 136–145)
SODIUM SERPL-SCNC: 139 MMOL/L (ref 136–145)
SP GR UR STRIP: 1.02 (ref 1–1.03)
SQUAMOUS #/AREA URNS HPF: ABNORMAL /HPF
T&S EXPIRATION DATE: NORMAL
UROBILINOGEN UR QL STRIP: ABNORMAL
WBC # UR STRIP: ABNORMAL /HPF
WBC NRBC COR # BLD AUTO: 13.23 10*3/MM3 (ref 3.4–10.8)
WBC NRBC COR # BLD AUTO: 14.91 10*3/MM3 (ref 3.4–10.8)
WHOLE BLOOD HOLD COAG: NORMAL
WHOLE BLOOD HOLD SPECIMEN: NORMAL

## 2024-10-02 PROCEDURE — 25810000003 SODIUM CHLORIDE 0.9 % SOLUTION: Performed by: PHYSICIAN ASSISTANT

## 2024-10-02 PROCEDURE — 86901 BLOOD TYPING SEROLOGIC RH(D): CPT

## 2024-10-02 PROCEDURE — 86900 BLOOD TYPING SEROLOGIC ABO: CPT

## 2024-10-02 PROCEDURE — 25010000002 HYDROMORPHONE 1 MG/ML SOLUTION: Performed by: NURSE PRACTITIONER

## 2024-10-02 PROCEDURE — 25010000002 MORPHINE PER 10 MG: Performed by: PHYSICIAN ASSISTANT

## 2024-10-02 PROCEDURE — 86901 BLOOD TYPING SEROLOGIC RH(D): CPT | Performed by: NURSE PRACTITIONER

## 2024-10-02 PROCEDURE — 85025 COMPLETE CBC W/AUTO DIFF WBC: CPT | Performed by: NURSE PRACTITIONER

## 2024-10-02 PROCEDURE — 96375 TX/PRO/DX INJ NEW DRUG ADDON: CPT

## 2024-10-02 PROCEDURE — 85730 THROMBOPLASTIN TIME PARTIAL: CPT | Performed by: NURSE PRACTITIONER

## 2024-10-02 PROCEDURE — 80053 COMPREHEN METABOLIC PANEL: CPT | Performed by: PHYSICIAN ASSISTANT

## 2024-10-02 PROCEDURE — 74177 CT ABD & PELVIS W/CONTRAST: CPT

## 2024-10-02 PROCEDURE — 86850 RBC ANTIBODY SCREEN: CPT | Performed by: NURSE PRACTITIONER

## 2024-10-02 PROCEDURE — G0378 HOSPITAL OBSERVATION PER HR: HCPCS

## 2024-10-02 PROCEDURE — 96361 HYDRATE IV INFUSION ADD-ON: CPT

## 2024-10-02 PROCEDURE — 85610 PROTHROMBIN TIME: CPT | Performed by: NURSE PRACTITIONER

## 2024-10-02 PROCEDURE — 99285 EMERGENCY DEPT VISIT HI MDM: CPT

## 2024-10-02 PROCEDURE — 25010000002 HYDROMORPHONE 1 MG/ML SOLUTION: Performed by: PHYSICIAN ASSISTANT

## 2024-10-02 PROCEDURE — 85025 COMPLETE CBC W/AUTO DIFF WBC: CPT | Performed by: PHYSICIAN ASSISTANT

## 2024-10-02 PROCEDURE — 25010000002 CEFTRIAXONE PER 250 MG: Performed by: NURSE PRACTITIONER

## 2024-10-02 PROCEDURE — 86900 BLOOD TYPING SEROLOGIC ABO: CPT | Performed by: NURSE PRACTITIONER

## 2024-10-02 PROCEDURE — 25510000001 IOPAMIDOL PER 1 ML: Performed by: PHYSICIAN ASSISTANT

## 2024-10-02 PROCEDURE — 96376 TX/PRO/DX INJ SAME DRUG ADON: CPT

## 2024-10-02 PROCEDURE — 81001 URINALYSIS AUTO W/SCOPE: CPT | Performed by: PHYSICIAN ASSISTANT

## 2024-10-02 PROCEDURE — 25010000002 ONDANSETRON PER 1 MG: Performed by: PHYSICIAN ASSISTANT

## 2024-10-02 PROCEDURE — 25010000002 HYDROMORPHONE 1 MG/ML SOLUTION: Performed by: STUDENT IN AN ORGANIZED HEALTH CARE EDUCATION/TRAINING PROGRAM

## 2024-10-02 PROCEDURE — 96365 THER/PROPH/DIAG IV INF INIT: CPT

## 2024-10-02 PROCEDURE — 25810000003 SODIUM CHLORIDE 0.9 % SOLUTION: Performed by: NURSE PRACTITIONER

## 2024-10-02 RX ORDER — IBUPROFEN 800 MG/1
800 TABLET, FILM COATED ORAL EVERY 8 HOURS PRN
COMMUNITY

## 2024-10-02 RX ORDER — SODIUM CHLORIDE 9 MG/ML
100 INJECTION, SOLUTION INTRAVENOUS CONTINUOUS
Status: DISCONTINUED | OUTPATIENT
Start: 2024-10-02 | End: 2024-10-04 | Stop reason: HOSPADM

## 2024-10-02 RX ORDER — CLINDAMYCIN HCL 150 MG
150 CAPSULE ORAL EVERY 8 HOURS SCHEDULED
Status: DISCONTINUED | OUTPATIENT
Start: 2024-10-03 | End: 2024-10-04 | Stop reason: HOSPADM

## 2024-10-02 RX ORDER — IOPAMIDOL 755 MG/ML
100 INJECTION, SOLUTION INTRAVASCULAR
Status: COMPLETED | OUTPATIENT
Start: 2024-10-02 | End: 2024-10-02

## 2024-10-02 RX ORDER — HYDROCODONE BITARTRATE AND ACETAMINOPHEN 5; 325 MG/1; MG/1
1 TABLET ORAL EVERY 6 HOURS PRN
COMMUNITY

## 2024-10-02 RX ORDER — CLINDAMYCIN HCL 150 MG
150 CAPSULE ORAL 3 TIMES DAILY
COMMUNITY

## 2024-10-02 RX ORDER — SODIUM CHLORIDE 0.9 % (FLUSH) 0.9 %
10 SYRINGE (ML) INJECTION AS NEEDED
Status: DISCONTINUED | OUTPATIENT
Start: 2024-10-02 | End: 2024-10-04 | Stop reason: HOSPADM

## 2024-10-02 RX ORDER — ONDANSETRON 2 MG/ML
4 INJECTION INTRAMUSCULAR; INTRAVENOUS ONCE
Status: COMPLETED | OUTPATIENT
Start: 2024-10-02 | End: 2024-10-02

## 2024-10-02 RX ORDER — NICOTINE 21 MG/24HR
1 PATCH, TRANSDERMAL 24 HOURS TRANSDERMAL
Status: DISCONTINUED | OUTPATIENT
Start: 2024-10-02 | End: 2024-10-04 | Stop reason: HOSPADM

## 2024-10-02 RX ORDER — ONDANSETRON 2 MG/ML
4 INJECTION INTRAMUSCULAR; INTRAVENOUS EVERY 6 HOURS PRN
Status: DISCONTINUED | OUTPATIENT
Start: 2024-10-02 | End: 2024-10-04 | Stop reason: HOSPADM

## 2024-10-02 RX ADMIN — NICOTINE 1 PATCH: 21 PATCH TRANSDERMAL at 19:48

## 2024-10-02 RX ADMIN — ONDANSETRON 4 MG: 2 INJECTION, SOLUTION INTRAMUSCULAR; INTRAVENOUS at 16:21

## 2024-10-02 RX ADMIN — IOPAMIDOL 100 ML: 755 INJECTION, SOLUTION INTRAVENOUS at 17:29

## 2024-10-02 RX ADMIN — SODIUM CHLORIDE 1000 ML: 9 INJECTION, SOLUTION INTRAVENOUS at 16:21

## 2024-10-02 RX ADMIN — HYDROMORPHONE HYDROCHLORIDE 0.5 MG: 1 INJECTION, SOLUTION INTRAMUSCULAR; INTRAVENOUS; SUBCUTANEOUS at 21:30

## 2024-10-02 RX ADMIN — HYDROMORPHONE HYDROCHLORIDE 1 MG: 1 INJECTION, SOLUTION INTRAMUSCULAR; INTRAVENOUS; SUBCUTANEOUS at 18:37

## 2024-10-02 RX ADMIN — CEFTRIAXONE 1000 MG: 1 INJECTION, POWDER, FOR SOLUTION INTRAMUSCULAR; INTRAVENOUS at 19:42

## 2024-10-02 RX ADMIN — SODIUM CHLORIDE 100 ML/HR: 9 INJECTION, SOLUTION INTRAVENOUS at 20:44

## 2024-10-02 RX ADMIN — MORPHINE SULFATE 4 MG: 4 INJECTION, SOLUTION INTRAMUSCULAR; INTRAVENOUS at 16:21

## 2024-10-02 RX ADMIN — HYDROMORPHONE HYDROCHLORIDE 0.5 MG: 1 INJECTION, SOLUTION INTRAMUSCULAR; INTRAVENOUS; SUBCUTANEOUS at 23:48

## 2024-10-02 NOTE — H&P
[facility] Medicine Services  History & Physical    Patient Name: Leonel Garg  : 2000  MRN: [id number]  Primary Care Physician:  Tico, No Known  Date of admission: 10/2/2024  Date and Time of Service: 10/2/2024 at 1915    Subjective      Chief Complaint: right flank pain     History of Present Illness: Leonel Garg is a very pleasant 24 y.o. male with no reported significant  CMH with a past medical history of substance abuse in  we denies current usage who presented to [facility] on 10/2/2024 with complaints of 2-day history of right-sided flank and abdominal pain associated with nausea but no vomiting.  He denies any shortness of air, chest pain, fever, dysuria, increased frequency or hematuria.  He reports the pain is 8 on a scale of 1-10.    CT abdomen pelvis with contrast per radiology shows an obstructing right UVJ calculus measuring 3 mm with moderate upstream hydroureteronephrosis mild right perinephric and periureteral inflammatory change and delayed right nephrogram likely related to obstruction recommend correlation with urinalysis to assess for infection.  Urinalysis was negative for nitrites negative for leukocytes 0-2 WBCs no bacteria.  Labs show calcium 10.6 WBC 14.91.  He is afebrile and vital signs are stable.  He was given 1 L fluid bolus in ED antiemetic and IV pain medication.  Urology has been consulted and he will be admitted for further evaluation and treatment.  Of note he recently had a dental procedure and is on day 3 of 6 taking clindamycin.    Review of Systems   Constitutional: Negative.    HENT:  Positive for dental problem.    Eyes: Negative.    Respiratory: Negative.     Cardiovascular: Negative.    Gastrointestinal:  Positive for abdominal pain and nausea.   Endocrine: Negative.    Genitourinary:  Positive for flank pain.   Skin: Negative.    Allergic/Immunologic: Negative.    Neurological: Negative.    Hematological: Negative.     Psychiatric/Behavioral: Negative.     All other systems reviewed and are negative.      Personal History     Past Medical History:   Diagnosis Date    Substance abuse        Past Surgical History:   Procedure Laterality Date    HARDWARE REMOVAL Bilateral 9/19/2020    Procedure: Right index finger constrictive ring removal;  Surgeon: Franky Nunn MD;  Location: [facility] OR;  Service: Orthopedics;  Laterality: Bilateral;       Family History: family history is not on file. Otherwise pertinent FHx was reviewed and not pertinent to current issue.    Social History:  reports that he has been smoking cigarettes. He has a 4 pack-year smoking history. He has never used smokeless tobacco. He reports current alcohol use. He reports that he does not currently use drugs.    Home Medications:  Prior to Admission Medications       Prescriptions Last Dose Informant Patient Reported? Taking?    clindamycin (CLEOCIN) 150 MG capsule 10/2/2024  Yes Yes    Take 1 capsule by mouth 3 (Three) Times a Day.    HYDROcodone-acetaminophen (NORCO) 5-325 MG per tablet 10/2/2024  Yes Yes    Take 1 tablet by mouth Every 6 (Six) Hours As Needed.    ibuprofen (ADVIL,MOTRIN) 800 MG tablet 10/2/2024  Yes Yes    Take 1 tablet by mouth Every 8 (Eight) Hours As Needed for Mild Pain.              Allergies:  Allergies   Allergen Reactions    Penicillins Unknown - High Severity       Objective      Vitals:   Temp:  [98.1 °F (36.7 °C)-98.2 °F (36.8 °C)] 98.2 °F (36.8 °C)  Heart Rate:  [56-80] 64  Resp:  [16-18] 16  BP: (122-146)/(49-88) 122/49  Body mass index is 28.62 kg/m².  Physical Exam  Vitals reviewed.   Constitutional:       Appearance: Normal appearance.   HENT:      Head: Normocephalic and atraumatic.      Right Ear: External ear normal.      Left Ear: External ear normal.      Nose: Nose normal.      Mouth/Throat:      Mouth: Mucous membranes are moist.   Eyes:      Extraocular Movements: Extraocular movements intact.   Cardiovascular:       Rate and Rhythm: Normal rate and regular rhythm.      Pulses: Normal pulses.      Heart sounds: Normal heart sounds.   Pulmonary:      Effort: Pulmonary effort is normal.      Breath sounds: Normal breath sounds.   Abdominal:      Palpations: Abdomen is soft.   Genitourinary:     Comments: deferred  Musculoskeletal:         General: Normal range of motion.      Cervical back: Normal range of motion and neck supple.   Skin:     General: Skin is warm and dry.   Neurological:      General: No focal deficit present.      Mental Status: He is alert and oriented to person, place, and time.   Psychiatric:         Mood and Affect: Mood normal.         Behavior: Behavior normal.         Thought Content: Thought content normal.         Judgment: Judgment normal.         Diagnostic Data:  Lab Results (last 24 hours)       Procedure Component Value Units Date/Time    Urinalysis With Microscopic If Indicated (No Culture) - Urine, Clean Catch [[id number]]  (Abnormal) Collected: 10/02/24 1715    Specimen: Urine, Clean Catch Updated: 10/02/24 1725     Color, UA Yellow     Appearance, UA Clear     pH, UA 5.5     Specific Gravity, UA 1.022     Glucose, UA Negative     Ketones, UA 80 mg/dL (3+)     Bilirubin, UA Negative     Blood, UA Small (1+)     Protein, UA Negative     Leuk Esterase, UA Negative     Nitrite, UA Negative     Urobilinogen, UA 1.0 E.U./dL    Urinalysis, Microscopic Only - Urine, Clean Catch [[id number]]  (Abnormal) Collected: 10/02/24 1715    Specimen: Urine, Clean Catch Updated: 10/02/24 1725     RBC, UA 3-5 /HPF      WBC, UA 0-2 /HPF      Bacteria, UA None Seen /HPF      Squamous Epithelial Cells, UA 0-2 /HPF      Hyaline Casts, UA None Seen /LPF      Methodology Automated Microscopy    Comprehensive Metabolic Panel [[id number]]  (Abnormal) Collected: 10/02/24 1602    Specimen: Blood Updated: 10/02/24 1634     Glucose 98 mg/dL      BUN 12 mg/dL      Creatinine 1.10 mg/dL      Sodium 139 mmol/L      Potassium 4.1  mmol/L      Chloride 103 mmol/L      CO2 23.5 mmol/L      Calcium 10.6 mg/dL      Total Protein 8.1 g/dL      Albumin 5.0 g/dL      ALT (SGPT) 21 U/L      AST (SGOT) 26 U/L      Alkaline Phosphatase 72 U/L      Total Bilirubin 0.7 mg/dL      Globulin 3.1 gm/dL      A/G Ratio 1.6 g/dL      BUN/Creatinine Ratio 10.9     Anion Gap 12.5 mmol/L      eGFR 95.5 mL/min/1.73     Narrative:      GFR Normal >60  Chronic Kidney Disease <60  Kidney Failure <15      CBC & Differential [[id number]]  (Abnormal) Collected: 10/02/24 1602    Specimen: Blood Updated: 10/02/24 1618    Narrative:      The following orders were created for panel order CBC & Differential.  Procedure                               Abnormality         Status                     ---------                               -----------         ------                     CBC Auto Differential[[id number]]        Abnormal            Final result                 Please view results for these tests on the individual orders.    CBC Auto Differential [[id number]]  (Abnormal) Collected: 10/02/24 1602    Specimen: Blood Updated: 10/02/24 1618     WBC 14.91 10*3/mm3      RBC 4.66 10*6/mm3      Hemoglobin 14.9 g/dL      Hematocrit 41.5 %      MCV 89.1 fL      MCH 32.0 pg      MCHC 35.9 g/dL      RDW 11.1 %      RDW-SD 35.8 fl      MPV 9.7 fL      Platelets 396 10*3/mm3      Neutrophil % 76.5 %      Lymphocyte % 13.1 %      Monocyte % 9.3 %      Eosinophil % 0.5 %      Basophil % 0.3 %      Immature Grans % 0.3 %      Neutrophils, Absolute 11.41 10*3/mm3      Lymphocytes, Absolute 1.95 10*3/mm3      Monocytes, Absolute 1.38 10*3/mm3      Eosinophils, Absolute 0.08 10*3/mm3      Basophils, Absolute 0.04 10*3/mm3      Immature Grans, Absolute 0.05 10*3/mm3      nRBC 0.0 /100 WBC     [place] Draw [[id number]] Collected: 10/02/24 1602    Specimen: Blood Updated: 10/02/24 1615    Narrative:      The following orders were created for panel order [place] Draw.  Procedure                                Abnormality         Status                     ---------                               -----------         ------                     Green Top (Gel)[[id number]]                                  Final result               Lavender Top[[id number]]                                     Final result               Gold Top - SST[[id number]]                                   Final result               Light Blue Top[[id number]]                                   Final result                 Please view results for these tests on the individual orders.    Green Top (Gel) [[id number]] Collected: 10/02/24 1602    Specimen: Blood Updated: 10/02/24 1615     Extra Tube Hold for add-ons.     Comment: Auto resulted.       Lavender Top [[id number]] Collected: 10/02/24 1602    Specimen: Blood Updated: 10/02/24 1615     Extra Tube hold for add-on     Comment: Auto resulted       Gold Top - SST [[id number]] Collected: 10/02/24 1602    Specimen: Blood Updated: 10/02/24 1615     Extra Tube Hold for add-ons.     Comment: Auto resulted.       Light Blue Top [[id number]] Collected: 10/02/24 1602    Specimen: Blood Updated: 10/02/24 1615     Extra Tube Hold for add-ons.     Comment: Auto resulted                Imaging Results (Last 24 Hours)       Procedure Component Value Units Date/Time    CT Abdomen Pelvis With Contrast [[id number]] Collected: 10/02/24 1730     Updated: 10/02/24 1739    Narrative:      CT ABDOMEN PELVIS W CONTRAST    Date of Exam: 10/2/2024 5:23 PM EDT    Indication: Right-sided abdominal pain.    Comparison: CT abdomen pelvis 1/30/2020    Technique: Axial CT images were obtained of the abdomen and pelvis following the uneventful intravenous administration of iodinated contrast. Sagittal and coronal reconstructions were performed.  Automated exposure control and iterative reconstruction   methods were used.        Findings:  Lung Bases: Bibasal atelectasis. Calcified granuloma in the left lower lobe. Calcified  mediastinal nodes, likely sequelae of prior granulomatous disease.    Liver: No significant abnormality.     Gallbladder and biliary tree: No significant abnormality.     Spleen: No significant abnormality.     Pancreas: No significant abnormality.     Adrenal glands: No significant abnormality.     Kidneys and ureters: Obstructing right ureterovesical junction calculus measuring 3 mm with moderate upstream hydroureteronephrosis. Mild right perinephric and periureteral fat stranding/inflammatory change. Delayed right nephrogram. Nonobstructing right   renal calculus measuring 3 mm. No left hydroureteronephrosis or radiopaque calculi seen.     Stomach and duodenum:No significant abnormality.    Small and large bowel: No significant abnormality. No evidence of bowel obstruction. Normal-appearing appendix.    Peritoneal cavity: No free fluid or free air.    Bladder: Under distended. Right UVJ calculus.     Pelvic organs: No significant abnormality.     Vasculature: No significant abnormality.     Lymph nodes: No pathologic appearing lymph nodes by imaging criteria.     Bones and soft tissues: Degenerative changes of the lower lumbar spine including a left asymmetric disc bulge at L5-S1 with osteophytic ridging likely contacting/compressing the traversing left S1 nerve root. No acute osseous abnormality.      Impression:      Impression:  Obstructing right UVJ calculus measuring 3 mm with moderate upstream hydroureteronephrosis.    Mild right perinephric and periureteral inflammatory change and delayed right nephrogram, likely related to obstruction. Recommend correlation with urinalysis to assess for infection.            Electronically Signed: Paul Zavala    10/2/2024 5:37 PM EDT    Workstation ID: [id number]              Assessment & Plan        This is a 24 y.o. male with:    Active and Resolved Problems  Active Hospital Problems    Diagnosis  POA    **Hydronephrosis with ureteral calculus [N13.2]  Yes      Priority: High    Leukocytosis [D72.829]  Yes     Priority: Medium    Tobacco abuse [Z72.0]  Yes      Resolved Hospital Problems   No resolved problems to display.       Hydronephrosis of ureteral calculus 3 mm right UVJ per CT report, n.p.o. past midnight, normal saline at 100 cc/h, strain all urine, urology consulted, 0.5 mg Dilaudid every 4 hours as needed pain, 4 mg IV Zofran every 4 hours as needed nausea    Leukocytosis, 14.9, likely secondary to recent dental work on day 3 of 6 of clindamycin p.o., hold for n.p.o. status and procedure, one-time dose 1 mg IV ceftriaxone for prophylaxis, afebrile    Tobacco abuse, encourage cessation 21 mg nicotine patch ordered.        VTE Prophylaxis:  Mechanical VTE prophylaxis orders are present.        The patient desires to be as follows:    CODE STATUS:    Code Status (Patient has no pulse and is not breathing): CPR (Attempt to Resuscitate)  Medical Interventions (Patient has pulse or is breathing): Full Support      .    Admission Status:  I believe this patient meets observation status.    Expected Length of Stay: pending urology evaluation and clinical course     PDMP and Medication Dispenses via Sidebar reviewed and consistent with patient reported medications.    I discussed the patient's findings and my recommendations with patient and family.      Signature:     This document has been electronically signed by CHAD Doll on October 2, 2024 21:29 EDT   [facility] Hospitalist Team

## 2024-10-02 NOTE — ED PROVIDER NOTES
Subjective   History of Present Illness  24-year-old male presents with 2-day history of right-sided abdominal pain.  Patient states that the pain started right lower abdominal pain now is in the right upper and periumbilical with nausea patient has no history of having kidney stones, urinary tract infections.  Denies dysuria frequency urination hematuria.  Denies fever chills.  Denies vomiting.  Patient states the pain is an 8 on a scale of 1-10.        Review of Systems   Constitutional:  Negative for chills, fatigue and fever.   Gastrointestinal:  Positive for abdominal pain and nausea. Negative for diarrhea and vomiting.   Genitourinary:  Negative for dysuria, frequency and hematuria.       History reviewed. No pertinent past medical history.    Allergies   Allergen Reactions    Penicillins Unknown - High Severity       Past Surgical History:   Procedure Laterality Date    HARDWARE REMOVAL Bilateral 9/19/2020    Procedure: Right index finger constrictive ring removal;  Surgeon: Franky Nunn MD;  Location: [facility];  Service: Orthopedics;  Laterality: Bilateral;       History reviewed. No pertinent family history.    Social History     Socioeconomic History    Marital status: Single   Tobacco Use    Smoking status: Every Day     Current packs/day: 1.00     Average packs/day: 1 pack/day for 4.0 years (4.0 ttl pk-yrs)     Types: Cigarettes    Smokeless tobacco: Never   Substance and Sexual Activity    Alcohol use: Yes    Drug use: Yes     Types: Marijuana     Comment: denies meth use even with positive drug screen     Sexual activity: Defer           Objective   Physical Exam  Vitals and nursing note reviewed.   Constitutional:       General: He is not in acute distress.     Appearance: Normal appearance. He is normal weight. He is not ill-appearing, toxic-appearing or diaphoretic.   Cardiovascular:      Rate and Rhythm: Normal rate and regular rhythm.   Pulmonary:      Effort: Pulmonary effort is  "normal.      Breath sounds: Normal breath sounds.   Abdominal:      Palpations: Abdomen is soft.      Tenderness: There is abdominal tenderness in the right upper quadrant. There is right CVA tenderness.   Neurological:      Mental Status: He is alert and oriented to person, place, and time.   Psychiatric:         Mood and Affect: Mood normal.         Behavior: Behavior normal.         Procedures           ED Course    /71   Pulse 80   Temp 98.1 °F (36.7 °C) (Oral)   Resp 16   Ht 182.9 cm (72\")   Wt 95.7 kg (211 lb)   SpO2 99%   BMI 28.62 kg/m²   Labs Reviewed   COMPREHENSIVE METABOLIC PANEL - Abnormal; Notable for the following components:       Result Value    Calcium 10.6 (*)     All other components within normal limits    Narrative:     GFR Normal >60  Chronic Kidney Disease <60  Kidney Failure <15     URINALYSIS W/ MICROSCOPIC IF INDICATED (NO CULTURE) - Abnormal; Notable for the following components:    Ketones, UA 80 mg/dL (3+) (*)     Blood, UA Small (1+) (*)     All other components within normal limits   CBC WITH AUTO DIFFERENTIAL - Abnormal; Notable for the following components:    WBC 14.91 (*)     MCHC 35.9 (*)     RDW 11.1 (*)     RDW-SD 35.8 (*)     Neutrophil % 76.5 (*)     Lymphocyte % 13.1 (*)     Neutrophils, Absolute 11.41 (*)     Monocytes, Absolute 1.38 (*)     All other components within normal limits   URINALYSIS, MICROSCOPIC ONLY - Abnormal; Notable for the following components:    RBC, UA 3-5 (*)     All other components within normal limits   RAINBOW DRAW    Narrative:     The following orders were created for panel order [place] Draw.  Procedure                               Abnormality         Status                     ---------                               -----------         ------                     Green Top (Gel)[[id number]]                                  Final result               Lavender Top[[id number]]                                     Final result             "   Gold Top - SST[[id number]]                                   Final result               Light Blue Top[[id number]]                                   Final result                 Please view results for these tests on the individual orders.   GREEN TOP   LAVENDER TOP   GOLD TOP - SST   LIGHT BLUE TOP   CBC AND DIFFERENTIAL    Narrative:     The following orders were created for panel order CBC & Differential.  Procedure                               Abnormality         Status                     ---------                               -----------         ------                     CBC Auto Differential[[id number]]        Abnormal            Final result                 Please view results for these tests on the individual orders.     Medications   sodium chloride 0.9 % flush 10 mL (has no administration in time range)   sodium chloride 0.9 % bolus 1,000 mL (0 mL Intravenous Stopped 10/2/24 1804)   ondansetron (ZOFRAN) injection 4 mg (4 mg Intravenous Given 10/2/24 1621)   morphine injection 4 mg (4 mg Intravenous Given 10/2/24 1621)   iopamidol (ISOVUE-370) 76 % injection 100 mL (100 mL Intravenous Given 10/2/24 1729)   HYDROmorphone (DILAUDID) injection 1 mg (1 mg Intravenous Given 10/2/24 1837)     CT Abdomen Pelvis With Contrast    Result Date: 10/2/2024  Impression: Obstructing right UVJ calculus measuring 3 mm with moderate upstream hydroureteronephrosis. Mild right perinephric and periureteral inflammatory change and delayed right nephrogram, likely related to obstruction. Recommend correlation with urinalysis to assess for infection. Electronically Signed: Paul Zavala  10/2/2024 5:37 PM EDT  Workstation ID: [id number]                                            Medical Decision Making  24-year-old male presents emergency department with complaints of right flank pain.  Physical exam is consistent with diffuse right flank pain with right CVA tenderness to palpation.  Vitals are stable and normal.  ER course: CT  abdomen pelvis reveals a right sided 3 mm obstructing kidney stone at level of the UVJ: CBC WBCs 15.91.  CMP: Normal UA shows 3-5 red blood cells.  Patient received morphine 4 mg and Zofran 4 mg IV for pain control, pain not controlled, Dilaudid 1 mg IV ordered for pain control CT and lab findings discussed with patient patient was admitted for pain control to Dr. GOMEZ hospitalist in stable condition, Dr. Mancini was consulted and follow-up with the patient in the morning.  Patient's ER course treatment plan and disposition discussed with Dr. Cope    Problems Addressed:  Hydronephrosis due to obstruction of ureter: complicated acute illness or injury  Kidney stone on right side: complicated acute illness or injury    Amount and/or Complexity of Data Reviewed  Labs: ordered.  Radiology: ordered.    Risk  Prescription drug management.  Decision regarding hospitalization.        Final diagnoses:   Kidney stone on right side   Hydronephrosis due to obstruction of ureter       ED Disposition  ED Disposition       ED Disposition   Decision to Admit    Condition   --    Comment   Level of Care: Med/Surg [1]   Admitting Physician: ELENA GOMEZ [[id number]]   Attending Physician: ELENA GOMEZ [[id number]]                 No follow-up provider specified.       Medication List      No changes were made to your prescriptions during this visit.            Artemio Barron PA-C  10/02/24 1922

## 2024-10-02 NOTE — Clinical Note
Level of Care: Med/Surg [1]   Admitting Physician: ELENA GOMEZ [[id number]]   Attending Physician: ELENA GOMEZ [[id number]]

## 2024-10-03 ENCOUNTER — ANESTHESIA (OUTPATIENT)
Dept: PERIOP | Facility: HOSPITAL | Age: 24
End: 2024-10-03
Payer: MEDICAID

## 2024-10-03 ENCOUNTER — ANESTHESIA EVENT (OUTPATIENT)
Dept: PERIOP | Facility: HOSPITAL | Age: 24
End: 2024-10-03
Payer: MEDICAID

## 2024-10-03 ENCOUNTER — APPOINTMENT (OUTPATIENT)
Dept: GENERAL RADIOLOGY | Facility: HOSPITAL | Age: 24
End: 2024-10-03
Payer: MEDICAID

## 2024-10-03 PROCEDURE — C1758 CATHETER, URETERAL: HCPCS | Performed by: UROLOGY

## 2024-10-03 PROCEDURE — 25010000002 FENTANYL CITRATE (PF) 50 MCG/ML SOLUTION

## 2024-10-03 PROCEDURE — G0378 HOSPITAL OBSERVATION PER HR: HCPCS

## 2024-10-03 PROCEDURE — 25010000002 HYDROMORPHONE 1 MG/ML SOLUTION

## 2024-10-03 PROCEDURE — 25010000002 ONDANSETRON PER 1 MG

## 2024-10-03 PROCEDURE — 25010000002 LIDOCAINE PF 2% 2 % SOLUTION

## 2024-10-03 PROCEDURE — 96376 TX/PRO/DX INJ SAME DRUG ADON: CPT

## 2024-10-03 PROCEDURE — 25010000002 MORPHINE PER 10 MG: Performed by: UROLOGY

## 2024-10-03 PROCEDURE — 25810000003 SODIUM CHLORIDE 0.9 % SOLUTION

## 2024-10-03 PROCEDURE — C1769 GUIDE WIRE: HCPCS | Performed by: UROLOGY

## 2024-10-03 PROCEDURE — 76000 FLUOROSCOPY <1 HR PHYS/QHP: CPT

## 2024-10-03 PROCEDURE — 25010000002 FENTANYL CITRATE (PF) 100 MCG/2ML SOLUTION

## 2024-10-03 PROCEDURE — 71045 X-RAY EXAM CHEST 1 VIEW: CPT

## 2024-10-03 PROCEDURE — 25810000003 SODIUM CHLORIDE 0.9 % SOLUTION: Performed by: UROLOGY

## 2024-10-03 PROCEDURE — 25010000002 PROPOFOL 200 MG/20ML EMULSION

## 2024-10-03 PROCEDURE — 25010000002 GLYCOPYRROLATE 1 MG/5ML SOLUTION

## 2024-10-03 PROCEDURE — 25010000002 HYDROMORPHONE 1 MG/ML SOLUTION: Performed by: STUDENT IN AN ORGANIZED HEALTH CARE EDUCATION/TRAINING PROGRAM

## 2024-10-03 PROCEDURE — 25010000002 DEXAMETHASONE PER 1 MG

## 2024-10-03 PROCEDURE — C2617 STENT, NON-COR, TEM W/O DEL: HCPCS | Performed by: UROLOGY

## 2024-10-03 DEVICE — URETERAL STENT
Type: IMPLANTABLE DEVICE | Site: URETER | Status: FUNCTIONAL
Brand: PERCUFLEX™ PLUS

## 2024-10-03 RX ORDER — ONDANSETRON 2 MG/ML
INJECTION INTRAMUSCULAR; INTRAVENOUS AS NEEDED
Status: DISCONTINUED | OUTPATIENT
Start: 2024-10-03 | End: 2024-10-03 | Stop reason: SURG

## 2024-10-03 RX ORDER — ONDANSETRON 2 MG/ML
4 INJECTION INTRAMUSCULAR; INTRAVENOUS ONCE AS NEEDED
Status: DISCONTINUED | OUTPATIENT
Start: 2024-10-03 | End: 2024-10-03 | Stop reason: HOSPADM

## 2024-10-03 RX ORDER — PROPOFOL 10 MG/ML
INJECTION, EMULSION INTRAVENOUS AS NEEDED
Status: DISCONTINUED | OUTPATIENT
Start: 2024-10-03 | End: 2024-10-03 | Stop reason: SURG

## 2024-10-03 RX ORDER — LABETALOL HYDROCHLORIDE 5 MG/ML
5 INJECTION, SOLUTION INTRAVENOUS
Status: DISCONTINUED | OUTPATIENT
Start: 2024-10-03 | End: 2024-10-03 | Stop reason: HOSPADM

## 2024-10-03 RX ORDER — OXYCODONE AND ACETAMINOPHEN 5; 325 MG/1; MG/1
1 TABLET ORAL EVERY 6 HOURS PRN
Qty: 30 TABLET | Refills: 0 | Status: SHIPPED | OUTPATIENT
Start: 2024-10-03

## 2024-10-03 RX ORDER — FENTANYL CITRATE 50 UG/ML
50 INJECTION, SOLUTION INTRAMUSCULAR; INTRAVENOUS
Status: DISCONTINUED | OUTPATIENT
Start: 2024-10-03 | End: 2024-10-03 | Stop reason: HOSPADM

## 2024-10-03 RX ORDER — LIDOCAINE HYDROCHLORIDE 20 MG/ML
INJECTION, SOLUTION EPIDURAL; INFILTRATION; INTRACAUDAL; PERINEURAL AS NEEDED
Status: DISCONTINUED | OUTPATIENT
Start: 2024-10-03 | End: 2024-10-03 | Stop reason: SURG

## 2024-10-03 RX ORDER — OXYCODONE HYDROCHLORIDE 5 MG/1
5 TABLET ORAL ONCE AS NEEDED
Status: DISCONTINUED | OUTPATIENT
Start: 2024-10-03 | End: 2024-10-03 | Stop reason: HOSPADM

## 2024-10-03 RX ORDER — OXYCODONE HYDROCHLORIDE 5 MG/1
5 TABLET ORAL EVERY 4 HOURS PRN
Status: DISCONTINUED | OUTPATIENT
Start: 2024-10-03 | End: 2024-10-04 | Stop reason: HOSPADM

## 2024-10-03 RX ORDER — DEXAMETHASONE SODIUM PHOSPHATE 4 MG/ML
INJECTION, SOLUTION INTRA-ARTICULAR; INTRALESIONAL; INTRAMUSCULAR; INTRAVENOUS; SOFT TISSUE AS NEEDED
Status: DISCONTINUED | OUTPATIENT
Start: 2024-10-03 | End: 2024-10-03 | Stop reason: SURG

## 2024-10-03 RX ORDER — DEXMEDETOMIDINE HYDROCHLORIDE 100 UG/ML
INJECTION, SOLUTION INTRAVENOUS AS NEEDED
Status: DISCONTINUED | OUTPATIENT
Start: 2024-10-03 | End: 2024-10-03 | Stop reason: SURG

## 2024-10-03 RX ORDER — DIPHENHYDRAMINE HYDROCHLORIDE 50 MG/ML
12.5 INJECTION INTRAMUSCULAR; INTRAVENOUS
Status: DISCONTINUED | OUTPATIENT
Start: 2024-10-03 | End: 2024-10-03 | Stop reason: HOSPADM

## 2024-10-03 RX ORDER — ACETAMINOPHEN 325 MG/1
650 TABLET ORAL ONCE AS NEEDED
Status: DISCONTINUED | OUTPATIENT
Start: 2024-10-03 | End: 2024-10-03 | Stop reason: HOSPADM

## 2024-10-03 RX ORDER — FENTANYL CITRATE 50 UG/ML
25 INJECTION, SOLUTION INTRAMUSCULAR; INTRAVENOUS
Status: DISCONTINUED | OUTPATIENT
Start: 2024-10-03 | End: 2024-10-03 | Stop reason: HOSPADM

## 2024-10-03 RX ORDER — NALOXONE HCL 0.4 MG/ML
0.4 VIAL (ML) INJECTION AS NEEDED
Status: DISCONTINUED | OUTPATIENT
Start: 2024-10-03 | End: 2024-10-03 | Stop reason: HOSPADM

## 2024-10-03 RX ORDER — FENTANYL CITRATE 50 UG/ML
INJECTION, SOLUTION INTRAMUSCULAR; INTRAVENOUS AS NEEDED
Status: DISCONTINUED | OUTPATIENT
Start: 2024-10-03 | End: 2024-10-03 | Stop reason: SURG

## 2024-10-03 RX ORDER — HYDRALAZINE HYDROCHLORIDE 20 MG/ML
5 INJECTION INTRAMUSCULAR; INTRAVENOUS
Status: DISCONTINUED | OUTPATIENT
Start: 2024-10-03 | End: 2024-10-03 | Stop reason: HOSPADM

## 2024-10-03 RX ORDER — GLYCOPYRROLATE 0.2 MG/ML
INJECTION INTRAMUSCULAR; INTRAVENOUS AS NEEDED
Status: DISCONTINUED | OUTPATIENT
Start: 2024-10-03 | End: 2024-10-03 | Stop reason: SURG

## 2024-10-03 RX ORDER — ALBUTEROL SULFATE 0.83 MG/ML
2.5 SOLUTION RESPIRATORY (INHALATION) ONCE AS NEEDED
Status: DISCONTINUED | OUTPATIENT
Start: 2024-10-03 | End: 2024-10-03 | Stop reason: HOSPADM

## 2024-10-03 RX ORDER — MORPHINE SULFATE 2 MG/ML
1 INJECTION, SOLUTION INTRAMUSCULAR; INTRAVENOUS
Status: DISCONTINUED | OUTPATIENT
Start: 2024-10-03 | End: 2024-10-04 | Stop reason: HOSPADM

## 2024-10-03 RX ORDER — CLINDAMYCIN HCL 300 MG
300 CAPSULE ORAL 3 TIMES DAILY
Qty: 21 CAPSULE | Refills: 0 | Status: SHIPPED | OUTPATIENT
Start: 2024-10-03 | End: 2024-10-11

## 2024-10-03 RX ORDER — SODIUM CHLORIDE 9 MG/ML
INJECTION, SOLUTION INTRAVENOUS CONTINUOUS PRN
Status: DISCONTINUED | OUTPATIENT
Start: 2024-10-03 | End: 2024-10-03 | Stop reason: SURG

## 2024-10-03 RX ADMIN — SODIUM CHLORIDE 100 ML/HR: 9 INJECTION, SOLUTION INTRAVENOUS at 17:36

## 2024-10-03 RX ADMIN — DEXMEDETOMIDINE HYDROCHLORIDE 6 MCG: 100 INJECTION, SOLUTION INTRAVENOUS at 12:41

## 2024-10-03 RX ADMIN — FENTANYL CITRATE 25 MCG: 50 INJECTION, SOLUTION INTRAMUSCULAR; INTRAVENOUS at 13:27

## 2024-10-03 RX ADMIN — OXYCODONE 5 MG: 5 TABLET ORAL at 17:53

## 2024-10-03 RX ADMIN — GLYCOPYRROLATE 0.2 MG: 0.2 INJECTION INTRAMUSCULAR; INTRAVENOUS at 12:27

## 2024-10-03 RX ADMIN — MORPHINE SULFATE 1 MG: 2 INJECTION, SOLUTION INTRAMUSCULAR; INTRAVENOUS at 15:12

## 2024-10-03 RX ADMIN — OXYCODONE 5 MG: 5 TABLET ORAL at 22:13

## 2024-10-03 RX ADMIN — HYDROMORPHONE HYDROCHLORIDE 0.5 MG: 1 INJECTION, SOLUTION INTRAMUSCULAR; INTRAVENOUS; SUBCUTANEOUS at 10:50

## 2024-10-03 RX ADMIN — SODIUM CHLORIDE: 9 INJECTION, SOLUTION INTRAVENOUS at 12:13

## 2024-10-03 RX ADMIN — PROPOFOL 250 MG: 10 INJECTION, EMULSION INTRAVENOUS at 12:18

## 2024-10-03 RX ADMIN — ONDANSETRON 4 MG: 2 INJECTION INTRAMUSCULAR; INTRAVENOUS at 12:44

## 2024-10-03 RX ADMIN — HYDROMORPHONE HYDROCHLORIDE 0.5 MG: 1 INJECTION, SOLUTION INTRAMUSCULAR; INTRAVENOUS; SUBCUTANEOUS at 12:41

## 2024-10-03 RX ADMIN — HYDROMORPHONE HYDROCHLORIDE 0.5 MG: 1 INJECTION, SOLUTION INTRAMUSCULAR; INTRAVENOUS; SUBCUTANEOUS at 13:40

## 2024-10-03 RX ADMIN — DEXAMETHASONE SODIUM PHOSPHATE 8 MG: 4 INJECTION, SOLUTION INTRAMUSCULAR; INTRAVENOUS at 12:27

## 2024-10-03 RX ADMIN — HYDROMORPHONE HYDROCHLORIDE 0.5 MG: 1 INJECTION, SOLUTION INTRAMUSCULAR; INTRAVENOUS; SUBCUTANEOUS at 04:56

## 2024-10-03 RX ADMIN — HYDROMORPHONE HYDROCHLORIDE 0.5 MG: 1 INJECTION, SOLUTION INTRAMUSCULAR; INTRAVENOUS; SUBCUTANEOUS at 07:14

## 2024-10-03 RX ADMIN — LIDOCAINE HYDROCHLORIDE 100 MG: 20 INJECTION, SOLUTION EPIDURAL; INFILTRATION; INTRACAUDAL; PERINEURAL at 12:18

## 2024-10-03 RX ADMIN — NICOTINE 1 PATCH: 21 PATCH TRANSDERMAL at 08:08

## 2024-10-03 RX ADMIN — FENTANYL CITRATE 100 MCG: 50 INJECTION, SOLUTION INTRAMUSCULAR; INTRAVENOUS at 12:18

## 2024-10-03 NOTE — CASE MANAGEMENT/SOCIAL WORK
Discharge Planning Assessment   Sin     Patient Name: Leonel Garg  MRN: [id number]  Today's Date: 10/3/2024    Admit Date: 10/2/2024    Plan: Home with s/o. Family will transport at d/c   Discharge Needs Assessment       Row Name 10/03/24 1230       Living Environment    People in Home significant other;child(gordy), dependent    Name(s) of People in Home Destiny and 2 dep children    Current Living Arrangements home    Potentially Unsafe Housing Conditions none    In the past 12 months has the electric, gas, oil, or water company threatened to shut off services in your home? No    Primary Care Provided by self    Provides Primary Care For no one    Family Caregiver if Needed significant other    Family Caregiver Names Destiny    Quality of Family Relationships helpful;involved;supportive    Able to Return to Prior Arrangements yes       Resource/Environmental Concerns    Resource/Environmental Concerns none    Transportation Concerns none       Transportation Needs    In the past 12 months, has lack of transportation kept you from medical appointments or from getting medications? no    In the past 12 months, has lack of transportation kept you from meetings, work, or from getting things needed for daily living? No       Food Insecurity    Within the past 12 months, you worried that your food would run out before you got the money to buy more. Never true    Within the past 12 months, the food you bought just didn't last and you didn't have money to get more. Never true       Transition Planning    Patient/Family Anticipates Transition to home with family    Patient/Family Anticipated Services at Transition none    Transportation Anticipated family or friend will provide       Discharge Needs Assessment    Readmission Within the Last 30 Days no previous admission in last 30 days    Equipment Currently Used at Home none    Concerns to be Addressed no discharge needs identified    Anticipated Changes Related to  Illness none    Equipment Needed After Discharge none                   Discharge Plan       Row Name 10/03/24 1232       Plan    Plan Home with s/o. Family will transport at d/c    Patient/Family in Agreement with Plan yes    Plan Comments CM met with pt and s/o Destiny at bedside to discuss discharge needs. Pt lives at home, with Destiny and 2 dep children. Pt is IADLs. Pt does not have PCP and would like to set up PCP at a later date. Number added to AVS. Pharmacy verified- pt enrolled in [facility] as requested. No issues stated affording food/medications or transport, and home environment is safe. No current DME/HHC and none anticipated on discharge. Family will provide transportation home. DC Barrier: Cysto with stone extraction 10/3, Urology following               Expected Discharge Date and Time       Expected Discharge Date Expected Discharge Time    Oct 4, 2024            Demographic Summary       Row Name 10/03/24 1230       General Information    Admission Type observation    Arrived From emergency department    Referral Source admission list    Reason for Consult discharge planning    Preferred Language English       Contact Information    Permission Granted to Share Info With               Functional Status       Row Name 10/03/24 1230       Functional Status    Usual Activity Tolerance good    Current Activity Tolerance moderate       Functional Status, IADL    Medications independent    Meal Preparation independent    Housekeeping independent    Laundry independent    Shopping independent       Mental Status    General Appearance WDL WDL       Mental Status Summary    Recent Changes in Mental Status/Cognitive Functioning no changes       Employment/    Employment Status unemployed           Carla Harrison RN    phone [phone]  fax [phone]

## 2024-10-03 NOTE — CONSULTS
Urology Consult Note    Patient:Leonel Garg :2000  Room:[facility]  Admit Date10/2024  Age:24 y.o.     SEX:male     DOS:10/3/2024     MR:[id number]     Visit:[id number]       Attending: Pastor Waite,*  Referring Provider: Dr Waite  Reason for Consultation: Right ureteral calculus    Patient Care Team:  Provider, No Known as PCP - General  Provider, No Known as PCP - Family Medicine    Chief complaint right flank pain    Subjective .     History of present illness: 24-year-old male who experienced an episode of severe right flank pain 2 weeks ago.  The pain resolved but then recurred approximately 4 days ago.  Yesterday the pain became so severe that he presented to the emergency room.  He did have associated nausea and vomiting.  Denies any fevers or chills.  CT scan revealed a 3 mm obstructing stone at the right ureterovesical junction as well as a nonobstructing 3 mm right renal calculus.  Patient's white blood cell count is mildly elevated to 13.23 but he is afebrile.  Serum creatinine is normal.  Patient has continued to require IV narcotics overnight.    Review of Systems  10 point review of systems were reviewed and are negative except for:  Constitution:  positive for See HPI    History  Past Medical History:   Diagnosis Date    Substance abuse      Past Surgical History:   Procedure Laterality Date    HARDWARE REMOVAL Bilateral 2020    Procedure: Right index finger constrictive ring removal;  Surgeon: Franky Nunn MD;  Location: [facility];  Service: Orthopedics;  Laterality: Bilateral;     Social History     Socioeconomic History    Marital status: Single   Tobacco Use    Smoking status: Every Day     Current packs/day: 1.00     Average packs/day: 1 pack/day for 4.0 years (4.0 ttl pk-yrs)     Types: Cigarettes    Smokeless tobacco: Never   Vaping Use    Vaping status: Every Day    Substances: Nicotine, Flavoring    Devices: Disposable   Substance and Sexual Activity     Alcohol use: Yes    Drug use: Not Currently     Comment: quit    Sexual activity: Defer     History reviewed. No pertinent family history.  Allergy  Allergies   Allergen Reactions    Penicillins Unknown - High Severity     Prior to Admission medications    Medication Sig Start Date End Date Taking? Authorizing Provider   clindamycin (CLEOCIN) 150 MG capsule Take 1 capsule by mouth 3 (Three) Times a Day.   Yes Padmini Doshi MD   HYDROcodone-acetaminophen (NORCO) 5-325 MG per tablet Take 1 tablet by mouth Every 6 (Six) Hours As Needed.   Yes Padmini Doshi MD   ibuprofen (ADVIL,MOTRIN) 800 MG tablet Take 1 tablet by mouth Every 8 (Eight) Hours As Needed for Mild Pain.   Yes Padmini Doshi MD         Objective     tMax 24 hours:  Temp (24hrs), Av.5 °F (36.9 °C), Min:98.1 °F (36.7 °C), Max:99.4 °F (37.4 °C)    Vital Sign Ranges:  Temp:  [98.1 °F (36.7 °C)-99.4 °F (37.4 °C)] 98.5 °F (36.9 °C)  Heart Rate:  [56-80] 67  Resp:  [16-18] 16  BP: (100-146)/(49-88) 100/61  Intake and Output Last 3 Shifts:  I/O last 3 completed shifts:  In: -   Out: 600 [Urine:600]      Physical Exam:   General Appearance alert, appears stated age, and cooperative  Head normocephalic, without obvious abnormality and atraumatic  Abdomen no guarding and no rebound tenderness  Skin no bleeding, bruising or rash  Neurologic Mental Status orientated to person, place, time and situation    Results Review:     Lab Results (last 24 hours)       Procedure Component Value Units Date/Time    Basic Metabolic Panel [[id number]]  (Abnormal) Collected: 10/02/24 2257    Specimen: Blood Updated: 10/02/24 2325     Glucose 133 mg/dL      BUN 9 mg/dL      Creatinine 1.11 mg/dL      Sodium 139 mmol/L      Potassium 3.7 mmol/L      Chloride 103 mmol/L      CO2 25.1 mmol/L      Calcium 9.3 mg/dL      BUN/Creatinine Ratio 8.1     Anion Gap 10.9 mmol/L      eGFR 94.5 mL/min/1.73     Narrative:      GFR Normal >60  Chronic Kidney Disease  <60  Kidney Failure <15      Protime-INR [[id number]]  (Normal) Collected: 10/02/24 2257    Specimen: Blood Updated: 10/02/24 2318     Protime 11.0 Seconds      INR 1.01    aPTT [[id number]]  (Normal) Collected: 10/02/24 2257    Specimen: Blood Updated: 10/02/24 2318     PTT 28.1 seconds     CBC & Differential [[id number]]  (Abnormal) Collected: 10/02/24 2257    Specimen: Blood Updated: 10/02/24 2306    Narrative:      The following orders were created for panel order CBC & Differential.  Procedure                               Abnormality         Status                     ---------                               -----------         ------                     CBC Auto Differential[[id number]]        Abnormal            Final result                 Please view results for these tests on the individual orders.    CBC Auto Differential [[id number]]  (Abnormal) Collected: 10/02/24 2257    Specimen: Blood Updated: 10/02/24 2306     WBC 13.23 10*3/mm3      RBC 4.09 10*6/mm3      Hemoglobin 13.0 g/dL      Hematocrit 36.6 %      MCV 89.5 fL      MCH 31.8 pg      MCHC 35.5 g/dL      RDW 11.2 %      RDW-SD 36.2 fl      MPV 9.6 fL      Platelets 319 10*3/mm3      Neutrophil % 76.9 %      Lymphocyte % 13.6 %      Monocyte % 8.8 %      Eosinophil % 0.2 %      Basophil % 0.2 %      Immature Grans % 0.3 %      Neutrophils, Absolute 10.17 10*3/mm3      Lymphocytes, Absolute 1.80 10*3/mm3      Monocytes, Absolute 1.16 10*3/mm3      Eosinophils, Absolute 0.03 10*3/mm3      Basophils, Absolute 0.03 10*3/mm3      Immature Grans, Absolute 0.04 10*3/mm3      nRBC 0.0 /100 WBC     Urinalysis With Microscopic If Indicated (No Culture) - Urine, Clean Catch [[id number]]  (Abnormal) Collected: 10/02/24 1715    Specimen: Urine, Clean Catch Updated: 10/02/24 1725     Color, UA Yellow     Appearance, UA Clear     pH, UA 5.5     Specific Gravity, UA 1.022     Glucose, UA Negative     Ketones, UA 80 mg/dL (3+)     Bilirubin, UA Negative     Blood, UA  Small (1+)     Protein, UA Negative     Leuk Esterase, UA Negative     Nitrite, UA Negative     Urobilinogen, UA 1.0 E.U./dL    Urinalysis, Microscopic Only - Urine, Clean Catch [[id number]]  (Abnormal) Collected: 10/02/24 1715    Specimen: Urine, Clean Catch Updated: 10/02/24 1725     RBC, UA 3-5 /HPF      WBC, UA 0-2 /HPF      Bacteria, UA None Seen /HPF      Squamous Epithelial Cells, UA 0-2 /HPF      Hyaline Casts, UA None Seen /LPF      Methodology Automated Microscopy    Comprehensive Metabolic Panel [[id number]]  (Abnormal) Collected: 10/02/24 1602    Specimen: Blood Updated: 10/02/24 1634     Glucose 98 mg/dL      BUN 12 mg/dL      Creatinine 1.10 mg/dL      Sodium 139 mmol/L      Potassium 4.1 mmol/L      Chloride 103 mmol/L      CO2 23.5 mmol/L      Calcium 10.6 mg/dL      Total Protein 8.1 g/dL      Albumin 5.0 g/dL      ALT (SGPT) 21 U/L      AST (SGOT) 26 U/L      Alkaline Phosphatase 72 U/L      Total Bilirubin 0.7 mg/dL      Globulin 3.1 gm/dL      A/G Ratio 1.6 g/dL      BUN/Creatinine Ratio 10.9     Anion Gap 12.5 mmol/L      eGFR 95.5 mL/min/1.73     Narrative:      GFR Normal >60  Chronic Kidney Disease <60  Kidney Failure <15      CBC & Differential [[id number]]  (Abnormal) Collected: 10/02/24 1602    Specimen: Blood Updated: 10/02/24 1618    Narrative:      The following orders were created for panel order CBC & Differential.  Procedure                               Abnormality         Status                     ---------                               -----------         ------                     CBC Auto Differential[[id number]]        Abnormal            Final result                 Please view results for these tests on the individual orders.    CBC Auto Differential [[id number]]  (Abnormal) Collected: 10/02/24 1602    Specimen: Blood Updated: 10/02/24 1618     WBC 14.91 10*3/mm3      RBC 4.66 10*6/mm3      Hemoglobin 14.9 g/dL      Hematocrit 41.5 %      MCV 89.1 fL      MCH 32.0 pg      MCHC  35.9 g/dL      RDW 11.1 %      RDW-SD 35.8 fl      MPV 9.7 fL      Platelets 396 10*3/mm3      Neutrophil % 76.5 %      Lymphocyte % 13.1 %      Monocyte % 9.3 %      Eosinophil % 0.5 %      Basophil % 0.3 %      Immature Grans % 0.3 %      Neutrophils, Absolute 11.41 10*3/mm3      Lymphocytes, Absolute 1.95 10*3/mm3      Monocytes, Absolute 1.38 10*3/mm3      Eosinophils, Absolute 0.08 10*3/mm3      Basophils, Absolute 0.04 10*3/mm3      Immature Grans, Absolute 0.05 10*3/mm3      nRBC 0.0 /100 WBC     [place] Draw [[id number]] Collected: 10/02/24 1602    Specimen: Blood Updated: 10/02/24 1615    Narrative:      The following orders were created for panel order [place] Draw.  Procedure                               Abnormality         Status                     ---------                               -----------         ------                     Green Top (Gel)[[id number]]                                  Final result               Lavender Top[[id number]]                                     Final result               Gold Top - SST[[id number]]                                   Final result               Light Blue Top[[id number]]                                   Final result                 Please view results for these tests on the individual orders.    Green Top (Gel) [[id number]] Collected: 10/02/24 1602    Specimen: Blood Updated: 10/02/24 1615     Extra Tube Hold for add-ons.     Comment: Auto resulted.       Lavender Top [[id number]] Collected: 10/02/24 1602    Specimen: Blood Updated: 10/02/24 1615     Extra Tube hold for add-on     Comment: Auto resulted       Gold Top - SST [[id number]] Collected: 10/02/24 1602    Specimen: Blood Updated: 10/02/24 1615     Extra Tube Hold for add-ons.     Comment: Auto resulted.       Light Blue Top [[id number]] Collected: 10/02/24 1602    Specimen: Blood Updated: 10/02/24 1615     Extra Tube Hold for add-ons.     Comment: Auto resulted              No results found for:  "\"URINECX\"     Imaging Results (Last 7 Days)       Procedure Component Value Units Date/Time    XR Chest 1 View [[id number]] Resulted: 10/03/24 0527     Updated: 10/03/24 0527    CT Abdomen Pelvis With Contrast [[id number]] Collected: 10/02/24 1730     Updated: 10/02/24 1739    Narrative:      CT ABDOMEN PELVIS W CONTRAST    Date of Exam: 10/2/2024 5:23 PM EDT    Indication: Right-sided abdominal pain.    Comparison: CT abdomen pelvis 1/30/2020    Technique: Axial CT images were obtained of the abdomen and pelvis following the uneventful intravenous administration of iodinated contrast. Sagittal and coronal reconstructions were performed.  Automated exposure control and iterative reconstruction   methods were used.        Findings:  Lung Bases: Bibasal atelectasis. Calcified granuloma in the left lower lobe. Calcified mediastinal nodes, likely sequelae of prior granulomatous disease.    Liver: No significant abnormality.     Gallbladder and biliary tree: No significant abnormality.     Spleen: No significant abnormality.     Pancreas: No significant abnormality.     Adrenal glands: No significant abnormality.     Kidneys and ureters: Obstructing right ureterovesical junction calculus measuring 3 mm with moderate upstream hydroureteronephrosis. Mild right perinephric and periureteral fat stranding/inflammatory change. Delayed right nephrogram. Nonobstructing right   renal calculus measuring 3 mm. No left hydroureteronephrosis or radiopaque calculi seen.     Stomach and duodenum:No significant abnormality.    Small and large bowel: No significant abnormality. No evidence of bowel obstruction. Normal-appearing appendix.    Peritoneal cavity: No free fluid or free air.    Bladder: Under distended. Right UVJ calculus.     Pelvic organs: No significant abnormality.     Vasculature: No significant abnormality.     Lymph nodes: No pathologic appearing lymph nodes by imaging criteria.     Bones and soft tissues: Degenerative " changes of the lower lumbar spine including a left asymmetric disc bulge at L5-S1 with osteophytic ridging likely contacting/compressing the traversing left S1 nerve root. No acute osseous abnormality.      Impression:      Impression:  Obstructing right UVJ calculus measuring 3 mm with moderate upstream hydroureteronephrosis.    Mild right perinephric and periureteral inflammatory change and delayed right nephrogram, likely related to obstruction. Recommend correlation with urinalysis to assess for infection.            Electronically Signed: Paul Zavala    10/2/2024 5:37 PM EDT    Workstation ID: [id number]            Inpatient Meds:   Scheduled Meds:[Held by provider] clindamycin, 150 mg, Oral, Q8H  nicotine, 1 patch, Transdermal, Q24H       Continuous Infusions:sodium chloride, 100 mL/hr, Last Rate: 100 mL/hr (10/02/24 2044)       PRN Meds:.  HYDROmorphone    ondansetron    sodium chloride      Assessment & Plan     Principal Problem:    Hydronephrosis with ureteral calculus  Active Problems:    Leukocytosis    Tobacco abuse    Obstructing right ureteral calculus  Nonobstructing right renal calculus    Plan  Discussed treatment options with the patient.  Patient is opted to proceed with cystoscopy, right ureteroscopic stone extraction with possible laser lithotripsy, placement right ureteral stent.  Risks, benefits, alternatives have been discussed with the patient and he wishes to proceed.    Patient understands that this is the first of a planned staged procedure as he will require removal of his stent in approximately 1 week.      I discussed the patient's findings and my recommendations with patient and family    Thank you for this  consult    Mark Mancini MD  10/03/24  07:11 EDT

## 2024-10-03 NOTE — ANESTHESIA POSTPROCEDURE EVALUATION
Patient: Leonel Garg    Procedure Summary       Date: 10/03/24 Room / Location: [facility] OR 08 / [facility] MAIN OR    Anesthesia Start: 1213 Anesthesia Stop: 1258    Procedure: CYSTOSCOPY URETEROSCOPY STENT INSERTION (Right) Diagnosis:     Surgeons: Mark Mancini MD Provider: Agustin Myers MD    Anesthesia Type: general ASA Status: 2            Anesthesia Type: general    Vitals  Vitals Value Taken Time   /62 10/03/24 1356   Temp 98.4 °F (36.9 °C) 10/03/24 1355   Pulse 64 10/03/24 1357   Resp 12 10/03/24 1355   SpO2 93 % 10/03/24 1357   Vitals shown include unfiled device data.        Post Anesthesia Care and Evaluation    Patient location during evaluation: PACU  Patient participation: complete - patient participated  Level of consciousness: awake  Pain scale: See nurse's notes for pain score.  Pain management: adequate    Airway patency: patent  Anesthetic complications: No anesthetic complications  PONV Status: none  Cardiovascular status: acceptable  Respiratory status: acceptable and spontaneous ventilation  Hydration status: acceptable    Comments: Patient seen and examined postoperatively; vital signs stable; SpO2 greater than or equal to 90%; cardiopulmonary status stable; nausea/vomiting adequately controlled; pain adequately controlled; no apparent anesthesia complications; patient discharged from anesthesia care when discharge criteria were met

## 2024-10-03 NOTE — PLAN OF CARE
Goal Outcome Evaluation:      NPO since midnight. Family at bedside. Urology consulted. Call light within reach, plan of care on going.

## 2024-10-03 NOTE — PLAN OF CARE
Goal Outcome Evaluation:                              Pt up ad urbano. Pt able to make needs known. Pt very painful, treated per MAR. Family at bedside. Plan of care ongoing.

## 2024-10-03 NOTE — ANESTHESIA PREPROCEDURE EVALUATION
Anesthesia Evaluation                  Airway   Mallampati: II  TM distance: >3 FB  Neck ROM: full  No difficulty expected  Dental - normal exam     Pulmonary - normal exam    breath sounds clear to auscultation  (+) a smoker Current, Abstained day of surgery, cigarettes,  Cardiovascular - normal exam    Rhythm: regular  Rate: normal        Neuro/Psych  GI/Hepatic/Renal/Endo    (+) renal disease- stones    Musculoskeletal     Abdominal  - normal exam   Substance History   (+) drug use (Former Meth user)     OB/GYN          Other        ROS/Med Hx Other:    History of present illness: 24-year-old male who experienced an episode of severe right flank pain 2 weeks ago.  The pain resolved but then recurred approximately 4 days ago.  Yesterday the pain became so severe that he presented to the emergency room.  He did have associated nausea and vomiting.  Denies any fevers or chills.  CT scan revealed a 3 mm obstructing stone at the right ureterovesical junction as well as a nonobstructing 3 mm right renal calculus.  Patient's white blood cell count is mildly elevated to 13.23 but he is afebrile.  Serum creatinine is normal.  Patient has continued to require IV narcotics overnight.     Review of Systems  10 point review of systems were reviewed and are negative except for:  Constitution:  positive for See HPI     History  Medical History  Past Medical History:  Diagnosis Date  · Substance abuse                           Anesthesia Plan    ASA 2     general     (LMA)  intravenous induction     Anesthetic plan, risks, benefits, and alternatives have been provided, discussed and informed consent has been obtained with: patient.    CODE STATUS:    Code Status (Patient has no pulse and is not breathing): CPR (Attempt to Resuscitate)  Medical Interventions (Patient has pulse or is breathing): Full Support

## 2024-10-03 NOTE — ANESTHESIA PROCEDURE NOTES
Airway  Date/Time: 10/3/2024 12:18 PM    General Information and Staff    Patient location during procedure: OR  CRNA/CAA: Xochitl Nelson CRNA    Indications and Patient Condition  Indications for airway management: airway protection    Preoxygenated: yes  MILS maintained throughout  Mask difficulty assessment: 0 - not attempted    Final Airway Details  Final airway type: supraglottic airway      Successful airway: I-gel  Size 5     Number of attempts at approach: 1  Assessment: lips, teeth, and gum same as pre-op and atraumatic intubation

## 2024-10-03 NOTE — PROGRESS NOTES
[facility] MEDICINE SERVICE  DAILY PROGRESS NOTE    NAME: Leonel Garg  : 2000  MRN: [id number]      LOS: 0 days     PROVIDER OF SERVICE: Pastor Waite MD    Chief Complaint: Hydronephrosis with ureteral calculus    Subjective:     Interval History:  History taken from: patient    Patient seen and examined at bedside this morning.  States that pain is controlled with pain medication.        Review of Systems: Negative except as described above  Review of Systems    Objective:     Vital Signs  Temp:  [98.1 °F (36.7 °C)-99.4 °F (37.4 °C)] 98.4 °F (36.9 °C)  Heart Rate:  [51-80] 51  Resp:  [15-18] 15  BP: (100-146)/(49-88) 109/60   Body mass index is 28.62 kg/m².    Physical Exam   Physical Exam  HENT:      Head: Normocephalic.   Cardiovascular:      Rate and Rhythm: Normal rate and regular rhythm.   Pulmonary:      Breath sounds: Normal breath sounds.   Abdominal:      General: Bowel sounds are normal.   Musculoskeletal:         General: Normal range of motion.   Neurological:      General: No focal deficit present.      Mental Status: He is alert and oriented to person, place, and time. Mental status is at baseline.   Psychiatric:         Mood and Affect: Mood normal.            Diagnostic Data    Results from last 7 days   Lab Units 10/02/24  2257 10/02/24  1602   WBC 10*3/mm3 13.23* 14.91*   HEMOGLOBIN g/dL 13.0 14.9   HEMATOCRIT % 36.6* 41.5   PLATELETS 10*3/mm3 319 396   GLUCOSE mg/dL 133* 98   CREATININE mg/dL 1.11 1.10   BUN mg/dL 9 12   SODIUM mmol/L 139 139   POTASSIUM mmol/L 3.7 4.1   AST (SGOT) U/L  --  26   ALT (SGPT) U/L  --  21   ALK PHOS U/L  --  72   BILIRUBIN mg/dL  --  0.7   ANION GAP mmol/L 10.9 12.5       XR Chest 1 View    Result Date: 10/3/2024  No radiographic findings of acute cardiopulmonary abnormality. Electronically Signed: Beck Lobato  10/3/2024 7:41 AM EDT  Workstation ID: [id number]    CT Abdomen Pelvis With Contrast    Result Date: 10/2/2024  Impression:  Obstructing right UVJ calculus measuring 3 mm with moderate upstream hydroureteronephrosis. Mild right perinephric and periureteral inflammatory change and delayed right nephrogram, likely related to obstruction. Recommend correlation with urinalysis to assess for infection. Electronically Signed: Paul Zavala  10/2/2024 5:37 PM EDT  Workstation ID: [id number]       I reviewed the patient's new clinical results.    Assessment/Plan:     Active and Resolved Problems  Active Hospital Problems    Diagnosis  POA    **Hydronephrosis with ureteral calculus [N13.2]  Yes    Leukocytosis [D72.829]  Yes    Tobacco abuse [Z72.0]  Yes      Resolved Hospital Problems   No resolved problems to display.       Hydronephrosis of ureteral calculus 3 mm right UVJ per CT report,  normal saline at 100 cc/h, strain all urine, 0.5 mg Dilaudid every 4 hours as needed pain, 4 mg IV Zofran every 4 hours as needed nausea.  Urology following scheduled for stent placement today       Tobacco abuse, encourage cessation 21 mg nicotine patch ordered.       VTE Prophylaxis:  Mechanical VTE prophylaxis orders are present.           Time: 35 minutes     Code Status and Medical Interventions: CPR (Attempt to Resuscitate); Full Support   Ordered at: 10/02/24 2001     Code Status (Patient has no pulse and is not breathing):    CPR (Attempt to Resuscitate)     Medical Interventions (Patient has pulse or is breathing):    Full Support       Signature: Electronically signed by Pastor Waite MD, 10/03/24, 10:20 EDT.  [facility] Hospitalist Team

## 2024-10-03 NOTE — OP NOTE
CYSTOSCOPY URETEROSCOPY RETROGRADE PYELOGRAM HOLMIUM LASER STENT INSERTION  Procedure Report    Patient Name:  Leonel Garg  YOB: 2000    Date of Surgery:  10/3/2024     Indications: 24-year-old male with severe right flank pain found to have a 3 mm distal right ureteral calculus.  He now presents for treatment of this.    Pre-op Diagnosis:   Obstructing distal right ureteral calculus       Post-Op Diagnosis Codes:  Passed distal right ureteral calculus    Procedure/CPT® Codes:      Procedure(s):  CYSTOSCOPY, right URETEROSCOPY, right ureteral STENT INSERTION    Staff:  Surgeon(s):  Mark Mancini MD            was responsible for performing the following activities:  None  and their skilled assistance was necessary for the success of this case.    Anesthesia: General    Estimated Blood Loss: minimal    Implants:    Implant Name Type Inv. Item Serial No.  Lot No. LRB No. Used Action   STNT PERCUFLX NO GW [id number] - [id number] Stent STNT PERCUFLX NO GW [id number]  [employer]iddle [id number] Right 1 Implanted       Specimen:          None      Findings: No stones found in the ureter or right kidney    Complications: None    Description of Procedure: Patient induced with general anesthesia and placed in dorsolithotomy position.  Prepped and draped in sterile fashion.  22 Vietnamese cystoscope introduced under direct vision.  Urethra is within normal limits.  Prostate is small and nonobstructing.  Bladder is normal throughout.  No stones are seen in the bladder.  Guidewire is passed through the cystoscope and up the right ureter under fluoroscopy.  Dual-lumen catheter used to dilate the distal ureter.  Rigid ureteroscope was then passed next to the wire and all the way up into the proximal ureter and no stone was seen.  Second wire was placed and the flexible ureteroscope was passed over this and up into the right kidney.  The entire kidney was well-visualized and no stones were seen.  The  ureteroscope was removed and the indwelling wire was backloaded through the cystoscope.  A 6 Luxembourgish by 26 cm ureteral stent was passed over the wire and up into the right kidney under fluoroscopy.  The wire was removed.  A good curl was seen in the right kidney under fluoroscopy and a good curl seen the bladder endoscopically.  The patient's bladder was emptied the cystoscope was removed.  The patient was taken out of the dorsolithotomy position and awakened from general anesthesia.  He was transported to the postanesthesia care unit in stable condition having tolerated the procedure well without any complications.    Of note the string was left attached to the stent.  We will plan on seeing him back early next week for removal of the stent.      Mark Mancini MD     Date: 10/3/2024  Time: 12:56 EDT

## 2024-10-04 VITALS
WEIGHT: 211 LBS | RESPIRATION RATE: 14 BRPM | TEMPERATURE: 98.4 F | OXYGEN SATURATION: 97 % | SYSTOLIC BLOOD PRESSURE: 124 MMHG | HEART RATE: 59 BPM | BODY MASS INDEX: 28.58 KG/M2 | HEIGHT: 72 IN | DIASTOLIC BLOOD PRESSURE: 65 MMHG

## 2024-10-04 PROCEDURE — G0378 HOSPITAL OBSERVATION PER HR: HCPCS

## 2024-10-04 RX ORDER — DOCUSATE SODIUM 100 MG/1
100 CAPSULE, LIQUID FILLED ORAL 2 TIMES DAILY
Status: DISCONTINUED | OUTPATIENT
Start: 2024-10-04 | End: 2024-10-04 | Stop reason: HOSPADM

## 2024-10-04 RX ADMIN — NICOTINE 1 PATCH: 21 PATCH TRANSDERMAL at 08:35

## 2024-10-04 RX ADMIN — DOCUSATE SODIUM 100 MG: 100 CAPSULE, LIQUID FILLED ORAL at 08:34

## 2024-10-04 RX ADMIN — OXYCODONE 5 MG: 5 TABLET ORAL at 04:06

## 2024-10-04 RX ADMIN — MAGNESIUM HYDROXIDE 10 ML: 2400 SUSPENSION ORAL at 05:22

## 2024-10-04 RX ADMIN — OXYCODONE 5 MG: 5 TABLET ORAL at 08:34

## 2024-10-04 NOTE — PLAN OF CARE
Goal Outcome Evaluation:   Pt A&Ox4 and up ad urbano. Pt pain controlled with PRN pain meds. Pt has not had a bowel movement, gave him milk of mag this AM. Plan to discharge home today and come back in a week to have stent removed.

## 2024-10-04 NOTE — DISCHARGE SUMMARY
"             [facility] Medicine Services  Discharge Summary    Date of Service: 10/4/2024  Patient Name: Leonel Garg  : 2000  MRN: [id number]    Date of Admission: 10/2/2024  Discharge Diagnosis: Hydronephrosis with ureteral calculus  Date of Discharge: 10/4/2024  Primary Care Physician: Provider, No Known      Presenting Problem:   Kidney stone on right side [N20.0]  Hydronephrosis with ureteral calculus [N13.2]  Hydronephrosis due to obstruction of ureter [N13.1]    Active and Resolved Hospital Problems:  Active Hospital Problems    Diagnosis POA    **Hydronephrosis with ureteral calculus [N13.2] Yes    Leukocytosis [D72.829] Yes    Tobacco abuse [Z72.0] Yes      Resolved Hospital Problems   No resolved problems to display.         Hospital Course     HPI:    \"Leonel Garg is a very pleasant 24 y.o. male with no reported significant  CMH with a past medical history of substance abuse in  we denies current usage who presented to [facility] on 10/2/2024 with complaints of 2-day history of right-sided flank and abdominal pain associated with nausea but no vomiting.  He denies any shortness of air, chest pain, fever, dysuria, increased frequency or hematuria.  He reports the pain is 8 on a scale of 1-10.     CT abdomen pelvis with contrast per radiology shows an obstructing right UVJ calculus measuring 3 mm with moderate upstream hydroureteronephrosis mild right perinephric and periureteral inflammatory change and delayed right nephrogram likely related to obstruction recommend correlation with urinalysis to assess for infection.  Urinalysis was negative for nitrites negative for leukocytes 0-2 WBCs no bacteria.  Labs show calcium 10.6 WBC 14.91.  He is afebrile and vital signs are stable.  He was given 1 L fluid bolus in ED antiemetic and IV pain medication.  Urology has been consulted and he will be admitted for further evaluation and treatment.  Of note he recently had a dental " "procedure and is on day 3 of 6 taking clindamycin.   \"    Hospital Course:  Hydronephrosis of ureteral calculus 3 mm right UVJ per CT report,  normal saline at 100 cc/h, strain all urine, 0.5 mg Dilaudid every 4 hours as needed pain, 4 mg IV Zofran every 4 hours as needed nausea.  Urology following scheduled for stent placement today        Tobacco abuse, encourage cessation 21 mg nicotine patch ordered.     Patient seen and examined at bedside this morning.  Underwent stent placement with urology yesterday.  He is passing gas and peeing.  Had a BM yesterday prior to procedure. Patient counseled to follow-up with urology outpatient for stent removal in 1 week      DISCHARGE Follow Up Recommendations for labs and diagnostics: Follow-up with urology in 1 week        Day of Discharge     Vital Signs:  Temp:  [98.1 °F (36.7 °C)-98.9 °F (37.2 °C)] 98.4 °F (36.9 °C)  Heart Rate:  [55-92] 59  Resp:  [10-15] 14  BP: (108-136)/(56-86) 124/65  Flow (L/min):  [6] 6    Physical Exam:  Physical Exam HENT:      Head: Normocephalic.   Cardiovascular:      Rate and Rhythm: Normal rate and regular rhythm.   Pulmonary:      Breath sounds: Normal breath sounds.   Abdominal:      General: Bowel sounds are normal.   Musculoskeletal:         General: Normal range of motion.   Neurological:      General: No focal deficit present.      Mental Status: He is alert and oriented to person, place, and time. Mental status is at baseline.   Psychiatric:         Mood and Affect: Mood normal.       Pertinent  and/or Most Recent Results     LAB RESULTS:      Lab 10/02/24  2257 10/02/24  1602   WBC 13.23* 14.91*   HEMOGLOBIN 13.0 14.9   HEMATOCRIT 36.6* 41.5   PLATELETS 319 396   NEUTROS ABS 10.17* 11.41*   IMMATURE GRANS (ABS) 0.04 0.05   LYMPHS ABS 1.80 1.95   MONOS ABS 1.16* 1.38*   EOS ABS 0.03 0.08   MCV 89.5 89.1   PROTIME 11.0  --    APTT 28.1  --          Lab 10/02/24  2257 10/02/24  1602   SODIUM 139 139   POTASSIUM 3.7 4.1   CHLORIDE 103 " 103   CO2 25.1 23.5   ANION GAP 10.9 12.5   BUN 9 12   CREATININE 1.11 1.10   EGFR 94.5 95.5   GLUCOSE 133* 98   CALCIUM 9.3 10.6*         Lab 10/02/24  1602   TOTAL PROTEIN 8.1   ALBUMIN 5.0   GLOBULIN 3.1   ALT (SGPT) 21   AST (SGOT) 26   BILIRUBIN 0.7   ALK PHOS 72         Lab 10/02/24  2257   PROTIME 11.0   INR 1.01             Lab 10/02/24  2257   ABO TYPING O   RH TYPING Positive   ANTIBODY SCREEN Negative         Brief Urine Lab Results  (Last result in the past 365 days)        Color   Clarity   Blood   Leuk Est   Nitrite   Protein   CREAT   Urine HCG        10/02/24 1715 Yellow   Clear   Small (1+)   Negative   Negative   Negative                 Microbiology Results (last 10 days)       ** No results found for the last 240 hours. **            XR Chest 1 View    Result Date: 10/3/2024  Impression: No radiographic findings of acute cardiopulmonary abnormality. Electronically Signed: Beck Lobato  10/3/2024 7:41 AM EDT  Workstation ID: [id number]    CT Abdomen Pelvis With Contrast    Result Date: 10/2/2024  Impression: Impression: Obstructing right UVJ calculus measuring 3 mm with moderate upstream hydroureteronephrosis. Mild right perinephric and periureteral inflammatory change and delayed right nephrogram, likely related to obstruction. Recommend correlation with urinalysis to assess for infection. Electronically Signed: Paul Zavala  10/2/2024 5:37 PM EDT  Workstation ID: [id number]             Results for orders placed during the hospital encounter of 01/30/20    Adult Transthoracic Echo Complete W/ Cont if Necessary Per Protocol    Interpretation Summary  · Estimated EF = 60%.  · Left ventricular systolic function is normal.    Indications  Palpitations  Premature ventricular contractions    Technically satisfactory study.  Mitral valve is structurally normal.  Tricuspid valve is structurally normal.  Aortic valve is structurally normal.  Pulmonic valve could not be well visualized.  No evidence for  mitral tricuspid or aortic regurgitation is seen by Doppler study.  Left atrium is normal in size.  Right atrium is normal in size.  Left ventricle is normal in size and contractility with ejection fraction of 60%.  Right ventricle is normal in size.  Atrial septum is intact.  Aorta is normal.  No pericardial effusion or intracardiac thrombus is seen.    Impression  Structurally and functionally normal cardiac valves.  Normal left ventricle size and contractility with ejection fraction of 60%.  No pericardial effusion or intracardiac thrombus is seen.      Labs Pending at Discharge:      Procedures Performed  Procedure(s):  CYSTOSCOPY URETEROSCOPY STENT INSERTION         Consults:   Consults       Date and Time Order Name Status Description    10/2/2024  7:13 PM Inpatient Urology Consult Completed               Discharge Details        Discharge Medications        New Medications        Instructions Start Date   oxyCODONE-acetaminophen 5-325 MG per tablet  Commonly known as: Percocet   1 tablet, Oral, Every 6 Hours PRN             Changes to Medications        Instructions Start Date   clindamycin 300 MG capsule  Commonly known as: CLEOCIN  What changed: You were already taking a medication with the same name, and this prescription was added. Make sure you understand how and when to take each.   300 mg, Oral, 3 Times Daily      clindamycin 150 MG capsule  Commonly known as: CLEOCIN  What changed: Another medication with the same name was added. Make sure you understand how and when to take each.   150 mg, Oral, 3 Times Daily             Continue These Medications        Instructions Start Date   HYDROcodone-acetaminophen 5-325 MG per tablet  Commonly known as: NORCO   1 tablet, Oral, Every 6 Hours PRN      ibuprofen 800 MG tablet  Commonly known as: ADVIL,MOTRIN   800 mg, Oral, Every 8 Hours PRN               Allergies   Allergen Reactions    Penicillins Unknown - High Severity         Discharge Disposition:  home      Diet:  Hospital:  Diet Order   Procedures    Diet: Regular/House; Fluid Consistency: Thin (IDDSI 0)         Discharge Activity:         CODE STATUS:  Code Status and Medical Interventions: CPR (Attempt to Resuscitate); Full Support   Ordered at: 10/02/24 2001     Code Status (Patient has no pulse and is not breathing):    CPR (Attempt to Resuscitate)     Medical Interventions (Patient has pulse or is breathing):    Full Support         No future appointments.        Time spent on Discharge including face to face service:  35 minutes    Signature: Electronically signed by Pastor Waite MD, 10/04/24, 08:36 EDT.  [facility], [place], operated by [facility] Hospitalist Team

## 2024-10-04 NOTE — PLAN OF CARE
Goal Outcome Evaluation:  Plan of Care Reviewed With: patient                           Pt A&O x4. Pt able to make needs known. Pain treated per MAR. Pt up ad urbano. Pt discharging home today.

## 2024-10-07 NOTE — CASE MANAGEMENT/SOCIAL WORK
Case Management Discharge Note      Final Note: Home with s/o         Selected Continued Care - Discharged on 10/4/2024 Admission date: 10/2/2024 - Discharge disposition: Home or Self Care         Transportation Services  Private: Car    Final Discharge Disposition Code: 01 - home or self-care

## 2025-03-05 ENCOUNTER — APPOINTMENT (OUTPATIENT)
Dept: CT IMAGING | Facility: HOSPITAL | Age: 25
End: 2025-03-05
Payer: MEDICAID

## 2025-03-05 ENCOUNTER — HOSPITAL ENCOUNTER (EMERGENCY)
Facility: HOSPITAL | Age: 25
Discharge: HOME | End: 2025-03-05
Admitting: EMERGENCY MEDICINE
Payer: MEDICAID

## 2025-03-05 VITALS
DIASTOLIC BLOOD PRESSURE: 58 MMHG | BODY MASS INDEX: 29.44 KG/M2 | HEIGHT: 72 IN | WEIGHT: 217.37 LBS | RESPIRATION RATE: 17 BRPM | SYSTOLIC BLOOD PRESSURE: 111 MMHG | HEART RATE: 64 BPM | TEMPERATURE: 98 F | OXYGEN SATURATION: 97 %

## 2025-03-05 DIAGNOSIS — N20.1 URETEROLITHIASIS: Primary | ICD-10-CM

## 2025-03-05 DIAGNOSIS — R10.9 FLANK PAIN: ICD-10-CM

## 2025-03-05 LAB
ANION GAP SERPL CALCULATED.3IONS-SCNC: 10.6 MMOL/L (ref 5–15)
BACTERIA UR QL AUTO: ABNORMAL /HPF
BASOPHILS # BLD AUTO: 0.06 10*3/MM3 (ref 0–0.2)
BASOPHILS NFR BLD AUTO: 0.5 % (ref 0–1.5)
BILIRUB UR QL STRIP: NEGATIVE
BUN SERPL-MCNC: 8 MG/DL (ref 6–20)
BUN/CREAT SERPL: 8.9 (ref 7–25)
CALCIUM SPEC-SCNC: 9.8 MG/DL (ref 8.6–10.5)
CHLORIDE SERPL-SCNC: 106 MMOL/L (ref 98–107)
CLARITY UR: CLEAR
CO2 SERPL-SCNC: 24.4 MMOL/L (ref 22–29)
COLOR UR: YELLOW
CREAT SERPL-MCNC: 0.9 MG/DL (ref 0.76–1.27)
DEPRECATED RDW RBC AUTO: 37.2 FL (ref 37–54)
EGFRCR SERPLBLD CKD-EPI 2021: 121.6 ML/MIN/1.73
EOSINOPHIL # BLD AUTO: 0.02 10*3/MM3 (ref 0–0.4)
EOSINOPHIL NFR BLD AUTO: 0.2 % (ref 0.3–6.2)
ERYTHROCYTE [DISTWIDTH] IN BLOOD BY AUTOMATED COUNT: 11.4 % (ref 12.3–15.4)
GLUCOSE SERPL-MCNC: 111 MG/DL (ref 65–99)
GLUCOSE UR STRIP-MCNC: NEGATIVE MG/DL
HCT VFR BLD AUTO: 40 % (ref 37.5–51)
HGB BLD-MCNC: 14 G/DL (ref 13–17.7)
HGB UR QL STRIP.AUTO: ABNORMAL
HOLD SPECIMEN: NORMAL
HYALINE CASTS UR QL AUTO: ABNORMAL /LPF
IMM GRANULOCYTES # BLD AUTO: 0.05 10*3/MM3 (ref 0–0.05)
IMM GRANULOCYTES NFR BLD AUTO: 0.4 % (ref 0–0.5)
KETONES UR QL STRIP: NEGATIVE
LEUKOCYTE ESTERASE UR QL STRIP.AUTO: NEGATIVE
LIPASE SERPL-CCNC: 33 U/L (ref 13–60)
LYMPHOCYTES # BLD AUTO: 0.99 10*3/MM3 (ref 0.7–3.1)
LYMPHOCYTES NFR BLD AUTO: 7.9 % (ref 19.6–45.3)
MCH RBC QN AUTO: 31.3 PG (ref 26.6–33)
MCHC RBC AUTO-ENTMCNC: 35 G/DL (ref 31.5–35.7)
MCV RBC AUTO: 89.5 FL (ref 79–97)
MONOCYTES # BLD AUTO: 0.64 10*3/MM3 (ref 0.1–0.9)
MONOCYTES NFR BLD AUTO: 5.1 % (ref 5–12)
NEUTROPHILS NFR BLD AUTO: 10.83 10*3/MM3 (ref 1.7–7)
NEUTROPHILS NFR BLD AUTO: 85.9 % (ref 42.7–76)
NITRITE UR QL STRIP: NEGATIVE
NRBC BLD AUTO-RTO: 0 /100 WBC (ref 0–0.2)
PH UR STRIP.AUTO: <=5 [PH] (ref 5–8)
PLATELET # BLD AUTO: 315 10*3/MM3 (ref 140–450)
PMV BLD AUTO: 9.3 FL (ref 6–12)
POTASSIUM SERPL-SCNC: 4.1 MMOL/L (ref 3.5–5.2)
PROT UR QL STRIP: ABNORMAL
RBC # BLD AUTO: 4.47 10*6/MM3 (ref 4.14–5.8)
RBC # UR STRIP: ABNORMAL /HPF
REF LAB TEST METHOD: ABNORMAL
SODIUM SERPL-SCNC: 141 MMOL/L (ref 136–145)
SP GR UR STRIP: 1.02 (ref 1–1.03)
SQUAMOUS #/AREA URNS HPF: ABNORMAL /HPF
UROBILINOGEN UR QL STRIP: ABNORMAL
WBC # UR STRIP: ABNORMAL /HPF
WBC NRBC COR # BLD AUTO: 12.59 10*3/MM3 (ref 3.4–10.8)

## 2025-03-05 PROCEDURE — 96375 TX/PRO/DX INJ NEW DRUG ADDON: CPT

## 2025-03-05 PROCEDURE — 81001 URINALYSIS AUTO W/SCOPE: CPT

## 2025-03-05 PROCEDURE — 74177 CT ABD & PELVIS W/CONTRAST: CPT

## 2025-03-05 PROCEDURE — 99285 EMERGENCY DEPT VISIT HI MDM: CPT

## 2025-03-05 PROCEDURE — 25010000002 KETOROLAC TROMETHAMINE PER 15 MG

## 2025-03-05 PROCEDURE — 25010000002 ONDANSETRON PER 1 MG

## 2025-03-05 PROCEDURE — 80048 BASIC METABOLIC PNL TOTAL CA: CPT

## 2025-03-05 PROCEDURE — 25510000001 IOPAMIDOL PER 1 ML

## 2025-03-05 PROCEDURE — 96374 THER/PROPH/DIAG INJ IV PUSH: CPT

## 2025-03-05 PROCEDURE — 83690 ASSAY OF LIPASE: CPT

## 2025-03-05 PROCEDURE — 25010000002 HYDROMORPHONE 1 MG/ML SOLUTION

## 2025-03-05 PROCEDURE — 85025 COMPLETE CBC W/AUTO DIFF WBC: CPT

## 2025-03-05 RX ORDER — KETOROLAC TROMETHAMINE 30 MG/ML
15 INJECTION, SOLUTION INTRAMUSCULAR; INTRAVENOUS ONCE
Status: COMPLETED | OUTPATIENT
Start: 2025-03-05 | End: 2025-03-05

## 2025-03-05 RX ORDER — SODIUM CHLORIDE 0.9 % (FLUSH) 0.9 %
10 SYRINGE (ML) INJECTION AS NEEDED
Status: DISCONTINUED | OUTPATIENT
Start: 2025-03-05 | End: 2025-03-05 | Stop reason: HOSPADM

## 2025-03-05 RX ORDER — IOPAMIDOL 755 MG/ML
100 INJECTION, SOLUTION INTRAVASCULAR
Status: COMPLETED | OUTPATIENT
Start: 2025-03-05 | End: 2025-03-05

## 2025-03-05 RX ORDER — HYDROCODONE BITARTRATE AND ACETAMINOPHEN 5; 325 MG/1; MG/1
1 TABLET ORAL EVERY 8 HOURS PRN
Qty: 9 TABLET | Refills: 0 | Status: SHIPPED | OUTPATIENT
Start: 2025-03-05 | End: 2025-03-05

## 2025-03-05 RX ORDER — ONDANSETRON 2 MG/ML
4 INJECTION INTRAMUSCULAR; INTRAVENOUS ONCE
Status: COMPLETED | OUTPATIENT
Start: 2025-03-05 | End: 2025-03-05

## 2025-03-05 RX ORDER — ONDANSETRON 4 MG/1
4 TABLET, ORALLY DISINTEGRATING ORAL EVERY 8 HOURS PRN
Qty: 15 TABLET | Refills: 0 | Status: SHIPPED | OUTPATIENT
Start: 2025-03-05

## 2025-03-05 RX ORDER — HYDROCODONE BITARTRATE AND ACETAMINOPHEN 5; 325 MG/1; MG/1
1 TABLET ORAL EVERY 8 HOURS PRN
Qty: 9 TABLET | Refills: 0 | Status: SHIPPED | OUTPATIENT
Start: 2025-03-05

## 2025-03-05 RX ADMIN — HYDROMORPHONE HYDROCHLORIDE 0.5 MG: 1 INJECTION, SOLUTION INTRAMUSCULAR; INTRAVENOUS; SUBCUTANEOUS at 14:37

## 2025-03-05 RX ADMIN — IOPAMIDOL 100 ML: 755 INJECTION, SOLUTION INTRAVENOUS at 13:49

## 2025-03-05 RX ADMIN — KETOROLAC TROMETHAMINE 15 MG: 30 INJECTION, SOLUTION INTRAMUSCULAR at 12:56

## 2025-03-05 RX ADMIN — ONDANSETRON 4 MG: 2 INJECTION INTRAMUSCULAR; INTRAVENOUS at 12:56

## 2025-03-05 NOTE — ED PROVIDER NOTES
Subjective   History of Present Illness  Chief complaint: Left flank pain, dysuria that started today.      Context: Patient is a 24-year-old male who presents to the ER with complaints of left flank pain and dysuria that he states started today.  Patient reports history of kidney stones, his latest was back in September which resulted in stent placement in early October.  Patient reports the pain feels exactly the same.  Patient reports that he woke up this morning with some difficulty getting his urine stream started and pain with urination which made him feel like he needed to have a bowel movement, while doing so he vomited.  Patient denies any chest pain, shortness of air or fever.    PCP: None              Review of Systems   Constitutional:  Negative for fever.       Past Medical History:   Diagnosis Date    Substance abuse        Allergies   Allergen Reactions    Penicillins Unknown - High Severity       Past Surgical History:   Procedure Laterality Date    CYSTOSCOPY, URETEROSCOPY, RETROGRADE PYELOGRAM, STENT INSERTION Right 10/3/2024    Procedure: CYSTOSCOPY URETEROSCOPY STENT INSERTION;  Surgeon: Mark Mancini MD;  Location: [facility];  Service: Urology;  Laterality: Right;    HARDWARE REMOVAL Bilateral 9/19/2020    Procedure: Right index finger constrictive ring removal;  Surgeon: Franky Nunn MD;  Location: [facility];  Service: Orthopedics;  Laterality: Bilateral;       No family history on file.    Social History     Socioeconomic History    Marital status: Single   Tobacco Use    Smoking status: Every Day     Current packs/day: 1.00     Average packs/day: 1 pack/day for 4.0 years (4.0 ttl pk-yrs)     Types: Cigarettes    Smokeless tobacco: Never   Vaping Use    Vaping status: Every Day    Substances: Nicotine, Flavoring    Devices: Disposable   Substance and Sexual Activity    Alcohol use: Not Currently    Drug use: Not Currently     Comment: quit    Sexual activity: Defer  "          Objective   Physical Exam  Vitals and nursing note reviewed.   Constitutional:       Appearance: Normal appearance.   HENT:      Head: Normocephalic.      Nose: Nose normal.      Mouth/Throat:      Mouth: Mucous membranes are moist.   Eyes:      Extraocular Movements: Extraocular movements intact.   Cardiovascular:      Rate and Rhythm: Normal rate and regular rhythm.      Pulses: Normal pulses.      Heart sounds: Normal heart sounds.   Pulmonary:      Effort: Pulmonary effort is normal.      Breath sounds: Normal breath sounds. No wheezing.   Abdominal:      Palpations: Abdomen is soft.      Tenderness: There is abdominal tenderness. There is left CVA tenderness. There is no right CVA tenderness, guarding or rebound.   Musculoskeletal:         General: Normal range of motion.      Cervical back: Normal range of motion.   Skin:     General: Skin is warm and dry.      Capillary Refill: Capillary refill takes less than 2 seconds.   Neurological:      General: No focal deficit present.      Mental Status: He is alert and oriented to person, place, and time.   Psychiatric:         Mood and Affect: Mood normal.         Behavior: Behavior normal.         Procedures           ED Course            /58   Pulse 64   Temp 98 °F (36.7 °C) (Oral)   Resp 17   Ht 182.9 cm (72\")   Wt 98.6 kg (217 lb 6 oz)   SpO2 97%   BMI 29.48 kg/m²   Labs Reviewed   URINALYSIS W/ CULTURE IF INDICATED - Abnormal; Notable for the following components:       Result Value    Blood, UA Large (3+) (*)     Protein, UA Trace (*)     All other components within normal limits    Narrative:     In absence of clinical symptoms, the presence of pyuria, bacteria, and/or nitrites on the urinalysis result does not correlate with infection.   URINALYSIS, MICROSCOPIC ONLY - Abnormal; Notable for the following components:    RBC, UA Too Numerous to Count (*)     WBC, UA 3-5 (*)     All other components within normal limits   BASIC METABOLIC " PANEL - Abnormal; Notable for the following components:    Glucose 111 (*)     All other components within normal limits    Narrative:     GFR Categories in Chronic Kidney Disease (CKD)      GFR Category          GFR (mL/min/1.73)    Interpretation  G1                     90 or greater         Normal or high (1)  G2                      60-89                Mild decrease (1)  G3a                   45-59                Mild to moderate decrease  G3b                   30-44                Moderate to severe decrease  G4                    15-29                Severe decrease  G5                    14 or less           Kidney failure          (1)In the absence of evidence of kidney disease, neither GFR category G1 or G2 fulfill the criteria for CKD.    eGFR calculation 2021 CKD-EPI creatinine equation, which does not include race as a factor   CBC WITH AUTO DIFFERENTIAL - Abnormal; Notable for the following components:    WBC 12.59 (*)     RDW 11.4 (*)     Neutrophil % 85.9 (*)     Lymphocyte % 7.9 (*)     Eosinophil % 0.2 (*)     Neutrophils, Absolute 10.83 (*)     All other components within normal limits   LIPASE - Normal   CBC AND DIFFERENTIAL    Narrative:     The following orders were created for panel order CBC & Differential.  Procedure                               Abnormality         Status                     ---------                               -----------         ------                     CBC Auto Differential[[id number]]        Abnormal            Final result                 Please view results for these tests on the individual orders.   EXTRA TUBES    Narrative:     The following orders were created for panel order Extra Tubes.  Procedure                               Abnormality         Status                     ---------                               -----------         ------                     Gold Top - SST[[id number]]                                   Final result                 Please view  results for these tests on the individual orders.   GOLD TOP - SST     Medications   ondansetron (ZOFRAN) injection 4 mg (4 mg Intravenous Given 3/5/25 1256)   ketorolac (TORADOL) injection 15 mg (15 mg Intravenous Given 3/5/25 1256)   iopamidol (ISOVUE-370) 76 % injection 100 mL (100 mL Intravenous Given 3/5/25 1349)   HYDROmorphone (DILAUDID) injection 0.5 mg (0.5 mg Intravenous Given 3/5/25 1437)     CT Abdomen Pelvis With Contrast    Result Date: 3/5/2025  Impression: 1. 3 mm left ureterovesical junction stone with mild left hydronephrosis and hydroureter. Electronically Signed: Mignon Montes MD  3/5/2025 2:03 PM EST  Workstation ID: [id number]                                              Medical Decision Making  Chart review:    Radiology interpretation: CT abdomen pelvis reviewed and interpreted by Ruth:  3 mm left ureterovesical junction stone with mild left hydronephrosis and hydroureter.  Further interpretation by radiologist as above    Lab interpretation:  Labs all viewed by me and significant for: White count 12.59, hemoglobin 14.0, BUN 8, creatinine 0.90, potassium 4.1, lipase 33.  UA blood large 3+, protein trace, RBCs too numerous to count, WBCs 3-5.    EKG was considered but was not emergently warranted at this time.    IV established and labs and scans were obtained to evaluate for infection, electrolyte abnormalities, anemia, pancreatitis, nephrolithiasis.  Initial examination patient was resting comfortably in the bed, nontoxic in appearance with no signs and symptoms of distress.  Physical examination revealed to be a tenderness, left flank tenderness, lungs clear bilaterally on auscultation.  Patient was given IV Zofran and IV Toradol for nausea and pain.  RN reports patient continues to complain of pain.  Patient was given IV Dilaudid.  On reexamination patient was resting comfortably in the bed, nontoxic appearance with no signs and symptoms of distress stating that the pain had improved  with medication.  Discussed findings and decision to discharge.  Inspect ran, Percocet 5/325 quantity 30, days 8, filled on 10/4/2024, Norco 5/325, quantity 20, days 5, filled on 9/26/2024, which was all around the time of his last kidney stone.  Patient was prescribed Norco's and Zofran ODT.  Advised patient to rest, drink plenty of fluids and increase fiber intake while taking pain medications, take medications as prescribed, do not operate heavy machinery or drive while taking narcotics, advised patient to strain urine with every micturition, follow-up with urology and to call tomorrow to schedule an appointment, follow-up with primary care to call tomorrow to schedule an appointment.  And to return to the ER for any new or worsening symptoms.  Patient verbalized understanding of all discharge instructions.    Appropriate PPE worn during exam.    i discussed findings with patient who voices understanding of discharge instructions, signs and symptoms requiring return to ED; discharged improved and in stable condition with follow up for re-evaluation.  This document is intended for medical expert use only. Reading of this document by patients and/or patient's family without participating medical staff guidance may result in misinterpretation and unintended morbidity.  Any interpretation of such data is the responsibility of the patient and/or family member responsible for the patient in concert with their primary or specialist providers, not to be left for sources of online searches such as CITTIO, Re-APP or similar queries. Relying on these approaches to knowledge may result in misinterpretation, misguided goals of care and even death should patients or family members try recommendations outside of the realm of professional medical care in a supervised inpatient environment.         Problems Addressed:  Flank pain: complicated acute illness or injury  Ureterolithiasis: complicated acute illness or injury    Amount  and/or Complexity of Data Reviewed  Labs: ordered.  Radiology: ordered.    Risk  Prescription drug management.        Final diagnoses:   Ureterolithiasis   Flank pain       ED Disposition  ED Disposition       ED Disposition   Discharge    Condition   Stable    Comment   --               [facility] - [facility]  [address] [address]  [phone]  Schedule an appointment as soon as possible for a visit   Call tomorrow to schedule an appointment for follow-up    [facility] - [facility] [address]  [phone]  Schedule an appointment as soon as possible for a visit   Call tomorrow to establish primary care provider.         Medication List        New Prescriptions      ondansetron ODT 4 MG disintegrating tablet  Commonly known as: ZOFRAN-ODT  Take 1 tablet by mouth Every 8 (Eight) Hours As Needed for Nausea or Vomiting.            Changed      HYDROcodone-acetaminophen 5-325 MG per tablet  Commonly known as: NORCO  Take 1 tablet by mouth Every 8 (Eight) Hours As Needed for Moderate Pain.  What changed:   when to take this  reasons to take this               Where to Get Your Medications        These medications were sent to [facility] - [address] [place] [address]      Hours: Monday to Friday 7 AM to 7 PM Phone: [phone]   HYDROcodone-acetaminophen 5-325 MG per tablet       These medications were sent to [facility] [address] [facility] [place] - [address] [facility] - [phone]  - [phone] [facility] [place] [address]      Phone: [phone]   ondansetron ODT 4 MG disintegrating tablet            Lola Borrero, APRN  03/05/25 1935

## 2025-06-17 ENCOUNTER — HOSPITAL ENCOUNTER (EMERGENCY)
Facility: HOSPITAL | Age: 25
Discharge: HOME | End: 2025-06-18
Admitting: EMERGENCY MEDICINE
Payer: MEDICAID

## 2025-06-17 DIAGNOSIS — M54.42 ACUTE LEFT-SIDED LOW BACK PAIN WITH LEFT-SIDED SCIATICA: Primary | ICD-10-CM

## 2025-06-17 DIAGNOSIS — M54.16 LUMBAR RADICULOPATHY: ICD-10-CM

## 2025-06-17 PROCEDURE — 99285 EMERGENCY DEPT VISIT HI MDM: CPT

## 2025-06-17 NOTE — Clinical Note
[facility] EMERGENCY DEPARTMENT  [address] [place] [address]  Phone: [phone]    Leonel Garg was seen and treated in our emergency department on 6/17/2025.  He may return to work on 06/20/2025.         Thank you for choosing [facility].    Raulito Pride, JERSE[place]

## 2025-06-18 ENCOUNTER — APPOINTMENT (OUTPATIENT)
Dept: CT IMAGING | Facility: HOSPITAL | Age: 25
End: 2025-06-18
Payer: MEDICAID

## 2025-06-18 ENCOUNTER — APPOINTMENT (OUTPATIENT)
Dept: GENERAL RADIOLOGY | Facility: HOSPITAL | Age: 25
End: 2025-06-18
Payer: MEDICAID

## 2025-06-18 VITALS
TEMPERATURE: 98.4 F | SYSTOLIC BLOOD PRESSURE: 107 MMHG | WEIGHT: 220.24 LBS | RESPIRATION RATE: 16 BRPM | BODY MASS INDEX: 29.83 KG/M2 | DIASTOLIC BLOOD PRESSURE: 57 MMHG | OXYGEN SATURATION: 96 % | HEART RATE: 53 BPM | HEIGHT: 72 IN

## 2025-06-18 LAB
ALBUMIN SERPL-MCNC: 4.5 G/DL (ref 3.5–5.2)
ALBUMIN/GLOB SERPL: 1.9 G/DL
ALP SERPL-CCNC: 69 U/L (ref 39–117)
ALT SERPL W P-5'-P-CCNC: 28 U/L (ref 1–41)
ANION GAP SERPL CALCULATED.3IONS-SCNC: 10.6 MMOL/L (ref 5–15)
AST SERPL-CCNC: 22 U/L (ref 1–40)
BASOPHILS # BLD AUTO: 0.05 10*3/MM3 (ref 0–0.2)
BASOPHILS NFR BLD AUTO: 0.6 % (ref 0–1.5)
BILIRUB SERPL-MCNC: 0.3 MG/DL (ref 0–1.2)
BILIRUB UR QL STRIP: NEGATIVE
BUN SERPL-MCNC: 9.4 MG/DL (ref 6–20)
BUN/CREAT SERPL: 10.7 (ref 7–25)
CALCIUM SPEC-SCNC: 9.4 MG/DL (ref 8.6–10.5)
CHLORIDE SERPL-SCNC: 104 MMOL/L (ref 98–107)
CLARITY UR: CLEAR
CO2 SERPL-SCNC: 25.4 MMOL/L (ref 22–29)
COLOR UR: YELLOW
CREAT SERPL-MCNC: 0.88 MG/DL (ref 0.76–1.27)
DEPRECATED RDW RBC AUTO: 37.2 FL (ref 37–54)
EGFRCR SERPLBLD CKD-EPI 2021: 122.4 ML/MIN/1.73
EOSINOPHIL # BLD AUTO: 0.16 10*3/MM3 (ref 0–0.4)
EOSINOPHIL NFR BLD AUTO: 2.1 % (ref 0.3–6.2)
ERYTHROCYTE [DISTWIDTH] IN BLOOD BY AUTOMATED COUNT: 11.5 % (ref 12.3–15.4)
GLOBULIN UR ELPH-MCNC: 2.4 GM/DL
GLUCOSE SERPL-MCNC: 91 MG/DL (ref 65–99)
GLUCOSE UR STRIP-MCNC: NEGATIVE MG/DL
HCT VFR BLD AUTO: 39.7 % (ref 37.5–51)
HGB BLD-MCNC: 14.3 G/DL (ref 13–17.7)
HGB UR QL STRIP.AUTO: NEGATIVE
IMM GRANULOCYTES # BLD AUTO: 0.02 10*3/MM3 (ref 0–0.05)
IMM GRANULOCYTES NFR BLD AUTO: 0.3 % (ref 0–0.5)
KETONES UR QL STRIP: NEGATIVE
LEUKOCYTE ESTERASE UR QL STRIP.AUTO: NEGATIVE
LYMPHOCYTES # BLD AUTO: 3.37 10*3/MM3 (ref 0.7–3.1)
LYMPHOCYTES NFR BLD AUTO: 43.3 % (ref 19.6–45.3)
MCH RBC QN AUTO: 32.2 PG (ref 26.6–33)
MCHC RBC AUTO-ENTMCNC: 36 G/DL (ref 31.5–35.7)
MCV RBC AUTO: 89.4 FL (ref 79–97)
MONOCYTES # BLD AUTO: 0.82 10*3/MM3 (ref 0.1–0.9)
MONOCYTES NFR BLD AUTO: 10.5 % (ref 5–12)
NEUTROPHILS NFR BLD AUTO: 3.36 10*3/MM3 (ref 1.7–7)
NEUTROPHILS NFR BLD AUTO: 43.2 % (ref 42.7–76)
NITRITE UR QL STRIP: NEGATIVE
NRBC BLD AUTO-RTO: 0 /100 WBC (ref 0–0.2)
PH UR STRIP.AUTO: 6 [PH] (ref 5–8)
PLATELET # BLD AUTO: 309 10*3/MM3 (ref 140–450)
PMV BLD AUTO: 9.6 FL (ref 6–12)
POTASSIUM SERPL-SCNC: 4 MMOL/L (ref 3.5–5.2)
PROT SERPL-MCNC: 6.9 G/DL (ref 6–8.5)
PROT UR QL STRIP: NEGATIVE
RBC # BLD AUTO: 4.44 10*6/MM3 (ref 4.14–5.8)
SODIUM SERPL-SCNC: 140 MMOL/L (ref 136–145)
SP GR UR STRIP: 1.02 (ref 1–1.03)
UROBILINOGEN UR QL STRIP: NORMAL
WBC NRBC COR # BLD AUTO: 7.78 10*3/MM3 (ref 3.4–10.8)

## 2025-06-18 PROCEDURE — 81003 URINALYSIS AUTO W/O SCOPE: CPT

## 2025-06-18 PROCEDURE — 80053 COMPREHEN METABOLIC PANEL: CPT

## 2025-06-18 PROCEDURE — 72110 X-RAY EXAM L-2 SPINE 4/>VWS: CPT

## 2025-06-18 PROCEDURE — 85025 COMPLETE CBC W/AUTO DIFF WBC: CPT

## 2025-06-18 PROCEDURE — 25010000002 DROPERIDOL PER 5 MG

## 2025-06-18 PROCEDURE — 96374 THER/PROPH/DIAG INJ IV PUSH: CPT

## 2025-06-18 PROCEDURE — 25510000001 IOPAMIDOL PER 1 ML

## 2025-06-18 PROCEDURE — 74177 CT ABD & PELVIS W/CONTRAST: CPT

## 2025-06-18 PROCEDURE — 72170 X-RAY EXAM OF PELVIS: CPT

## 2025-06-18 RX ORDER — IBUPROFEN 800 MG/1
800 TABLET, FILM COATED ORAL
Qty: 30 TABLET | Refills: 0 | Status: SHIPPED | OUTPATIENT
Start: 2025-06-18 | End: 2025-06-28

## 2025-06-18 RX ORDER — DROPERIDOL 2.5 MG/ML
1.25 INJECTION, SOLUTION INTRAMUSCULAR; INTRAVENOUS ONCE
Status: COMPLETED | OUTPATIENT
Start: 2025-06-18 | End: 2025-06-18

## 2025-06-18 RX ORDER — HYDROCODONE BITARTRATE AND ACETAMINOPHEN 5; 325 MG/1; MG/1
1 TABLET ORAL ONCE AS NEEDED
Refills: 0 | Status: COMPLETED | OUTPATIENT
Start: 2025-06-18 | End: 2025-06-18

## 2025-06-18 RX ORDER — IOPAMIDOL 755 MG/ML
100 INJECTION, SOLUTION INTRAVASCULAR
Status: COMPLETED | OUTPATIENT
Start: 2025-06-18 | End: 2025-06-18

## 2025-06-18 RX ORDER — SODIUM CHLORIDE 0.9 % (FLUSH) 0.9 %
10 SYRINGE (ML) INJECTION AS NEEDED
Status: DISCONTINUED | OUTPATIENT
Start: 2025-06-18 | End: 2025-06-18 | Stop reason: HOSPADM

## 2025-06-18 RX ORDER — METHOCARBAMOL 500 MG/1
500 TABLET, FILM COATED ORAL 2 TIMES DAILY PRN
Qty: 15 TABLET | Refills: 0 | Status: SHIPPED | OUTPATIENT
Start: 2025-06-18

## 2025-06-18 RX ADMIN — DROPERIDOL 1.25 MG: 2.5 INJECTION, SOLUTION INTRAMUSCULAR; INTRAVENOUS at 02:03

## 2025-06-18 RX ADMIN — IOPAMIDOL 100 ML: 755 INJECTION, SOLUTION INTRAVENOUS at 02:31

## 2025-06-18 RX ADMIN — HYDROCODONE BITARTRATE AND ACETAMINOPHEN 1 TABLET: 5; 325 TABLET ORAL at 00:56

## 2025-06-18 NOTE — DISCHARGE INSTRUCTIONS
You were seen tonight in the emergency department to evaluate for lower back pain which radiates into your hip and leg.  X-rays of your lower back and pelvis were negative for any fracture or dislocation and a CT scan of your abdomen and pelvis was negative for any acute or emergent findings.  Lab work was also all unremarkable with no abnormal findings needing to be addressed.  Your plan of care will be to take the Robaxin as needed for pain and take ibuprofen 3 times a day as directed for 10 days.  Also please contact patient phyllis Vogt, listed in these discharge instructions, for assistance in obtaining primary care.  Your primary care provider is crucial to you receiving further evaluation and treatment if indicated for tonight's complaint.  Return to the emergency department if complaint worsens with worsening pain, numbness in the hip or leg, inability to bear weight due to pain, or with any other medical complaint or concern.

## 2025-06-18 NOTE — ED PROVIDER NOTES
Subjective     Chief Complaint   Patient presents with    Back Pain     History of Present Illness  Chief complaint: Back pain    Context: Patient is a 24-year-old male with history of substance abuse who presents to the emergency department with complaints of back pain.  Patient states that he has been having left lower back pain for about 1 month that has been worsening considerably in the last week.  Patient describes the pain as starting in his left lower back and radiating down through his buttocks and his hip.  Patient states the pain has even radiated down into his left lower leg in recent days.  Patient describes the pain as 10/10 and unlike previous pain from flank and lower back due to kidney stones.  Patient denies any other complaints at this time, denies recent illness and denies chest pain, shortness of breath, abdominal pain.    PCP: None        Review of Systems   Musculoskeletal:  Positive for back pain.       Past Medical History:   Diagnosis Date    Substance abuse        Allergies   Allergen Reactions    Penicillins Unknown - High Severity       Past Surgical History:   Procedure Laterality Date    CYSTOSCOPY, URETEROSCOPY, RETROGRADE PYELOGRAM, STENT INSERTION Right 10/3/2024    Procedure: CYSTOSCOPY URETEROSCOPY STENT INSERTION;  Surgeon: Mark Mancini MD;  Location: [facility] OR;  Service: Urology;  Laterality: Right;    HARDWARE REMOVAL Bilateral 9/19/2020    Procedure: Right index finger constrictive ring removal;  Surgeon: Franky Nunn MD;  Location: [facility] OR;  Service: Orthopedics;  Laterality: Bilateral;       No family history on file.    Social History     Socioeconomic History    Marital status: Single   Tobacco Use    Smoking status: Every Day     Current packs/day: 1.00     Average packs/day: 1 pack/day for 4.0 years (4.0 ttl pk-yrs)     Types: Cigarettes    Smokeless tobacco: Never   Vaping Use    Vaping status: Every Day    Substances: Nicotine, Flavoring     Devices: Disposable   Substance and Sexual Activity    Alcohol use: Not Currently    Drug use: Not Currently     Comment: quit    Sexual activity: Defer           Objective   Physical Exam  Vitals and nursing note reviewed.   Constitutional:       General: He is not in acute distress.     Appearance: Normal appearance. He is normal weight. He is not ill-appearing or toxic-appearing.   HENT:      Head: Normocephalic and atraumatic.      Nose: No congestion or rhinorrhea.      Mouth/Throat:      Mouth: Mucous membranes are moist.      Pharynx: Oropharynx is clear.   Eyes:      Extraocular Movements: Extraocular movements intact.      Pupils: Pupils are equal, round, and reactive to light.   Cardiovascular:      Rate and Rhythm: Normal rate and regular rhythm.      Pulses: Normal pulses.      Heart sounds: Normal heart sounds.   Pulmonary:      Effort: Pulmonary effort is normal. No respiratory distress.      Breath sounds: Normal breath sounds.   Abdominal:      General: Abdomen is flat. Bowel sounds are normal. There is no distension.      Palpations: Abdomen is soft. There is no mass.      Tenderness: There is no abdominal tenderness.   Musculoskeletal:         General: Tenderness present. No swelling. Normal range of motion.      Cervical back: Normal range of motion and neck supple. No tenderness.      Lumbar back: Tenderness present.      Right lower leg: No edema.      Left lower leg: No edema.   Skin:     General: Skin is warm.      Capillary Refill: Capillary refill takes less than 2 seconds.      Coloration: Skin is not jaundiced or pale.   Neurological:      General: No focal deficit present.      Mental Status: He is alert. Mental status is at baseline.   Psychiatric:         Mood and Affect: Mood normal.         Thought Content: Thought content normal.         Judgment: Judgment normal.         Procedures           ED Course  ED Course as of 06/18/25 0335   Wed Jun 18, 2025   0100 X-ray final results  "reviewed. [RS]      ED Course User Index  [RS] Raulito Pride, [place]      /59   Pulse 51   Temp 98.4 °F (36.9 °C)   Resp 16   Ht 182.9 cm (72\")   Wt 99.9 kg (220 lb 3.8 oz)   SpO2 97%   BMI 29.87 kg/m²   Labs Reviewed   CBC WITH AUTO DIFFERENTIAL - Abnormal; Notable for the following components:       Result Value    MCHC 36.0 (*)     RDW 11.5 (*)     Lymphocytes, Absolute 3.37 (*)     All other components within normal limits   URINALYSIS W/ CULTURE IF INDICATED - Normal    Narrative:     In absence of clinical symptoms, the presence of pyuria, bacteria, and/or nitrites on the urinalysis result does not correlate with infection.  Urine microscopic not indicated.   COMPREHENSIVE METABOLIC PANEL    Narrative:     GFR Categories in Chronic Kidney Disease (CKD)              GFR Category          GFR (mL/min/1.73)    Interpretation  G1                    90 or greater        Normal or high (1)  G2                    60-89                Mild decrease (1)  G3a                   45-59                Mild to moderate decrease  G3b                   30-44                Moderate to severe decrease  G4                    15-29                Severe decrease  G5                    14 or less           Kidney failure    (1)In the absence of evidence of kidney disease, neither GFR category G1 or G2 fulfill the criteria for CKD.    eGFR calculation 2021 CKD-EPI creatinine equation, which does not include race as a factor   CBC AND DIFFERENTIAL    Narrative:     The following orders were created for panel order CBC & Differential.  Procedure                               Abnormality         Status                     ---------                               -----------         ------                     CBC Auto Differential[[id number]]        Abnormal            Final result                 Please view results for these tests on the individual orders.     Medications   sodium chloride 0.9 % flush 10 mL (has no " "administration in time range)   HYDROcodone-acetaminophen (NORCO) 5-325 MG per tablet 1 tablet (1 tablet Oral Given 6/18/25 0056)   droperidol (INAPSINE) injection 1.25 mg (1.25 mg Intravenous Given 6/18/25 0203)   iopamidol (ISOVUE-370) 76 % injection 100 mL (100 mL Intravenous Given 6/18/25 0231)     CT Abdomen Pelvis With Contrast  Result Date: 6/18/2025  Impression: 1.No acute intra-abdominal or intrapelvic process. 2.Ancillary findings as described above. Electronically Signed: Robina Chau MD  6/18/2025 2:39 AM EDT  Workstation ID: [id number]    XR Spine Lumbar Complete 4+VW  Result Date: 6/18/2025  Impression: No acute osseous abnormality or significant degenerative changes of the pelvis or the lumbar spine. Electronically Signed: Robina Chau MD  6/18/2025 12:53 AM EDT  Workstation ID: [id number]    XR Pelvis 1 or 2 View  Result Date: 6/18/2025  Impression: No acute osseous abnormality or significant degenerative changes of the pelvis or the lumbar spine. Electronically Signed: Robina Chau MD  6/18/2025 12:53 AM EDT  Workstation ID: [id number]                                                     Medical Decision Making  /59   Pulse 51   Temp 98.4 °F (36.9 °C)   Resp 16   Ht 182.9 cm (72\")   Wt 99.9 kg (220 lb 3.8 oz)   SpO2 97%   BMI 29.87 kg/m²      Chart review: Patient's previous visits for utero lithiasis and kidney stones were reviewed.    Patient is a 24-year-old male who presents to the emergency department with complaints of lower back pain radiating down into his leg.  See full HPI with primary assessment and exam above.    Patient is placed into ED bed and gown for assessment.  IV access is maintained for labs and medication if indicated.  Primary differentials include but are not limited to pyelonephritis, kidney stones, musculoskeletal strain.    Comorbidities:  has a past medical history of Substance abuse.    Discussion with provider: Dr. Miranda    Radiology interpretation:  Imaging " reviewed by ED Attending physician and myself and interpreted by radiologist.  CT Abdomen Pelvis With Contrast  Result Date: 6/18/2025  Impression: 1.No acute intra-abdominal or intrapelvic process. 2.Ancillary findings as described above. Electronically Signed: Robina Chau MD  6/18/2025 2:39 AM EDT  Workstation ID: [id number]    XR Spine Lumbar Complete 4+VW  Result Date: 6/18/2025  Impression: No acute osseous abnormality or significant degenerative changes of the pelvis or the lumbar spine. Electronically Signed: Robina Chau MD  6/18/2025 12:53 AM EDT  Workstation ID: [id number]    XR Pelvis 1 or 2 View  Result Date: 6/18/2025  Impression: No acute osseous abnormality or significant degenerative changes of the pelvis or the lumbar spine. Electronically Signed: Robina Chau MD  6/18/2025 12:53 AM EDT  Workstation ID: [id number]    Lab interpretation:  Labs unremarkable with no electrolyte derangement or kidney injury on CMP, no leukocytosis or anemia on CBC, and no evidence for infection on UA.    EKG considered but not clinically indicated for this patient's complaint and presentation.    Medications given in ED:  sodium chloride 0.9 % flush 10 mL (has no administration in time range)  HYDROcodone-acetaminophen (NORCO) 5-325 MG per tablet 1 tablet (1 tablet Oral Given 6/18/25 0056)  droperidol (INAPSINE) injection 1.25 mg (1.25 mg Intravenous Given 6/18/25 0203)  iopamidol (ISOVUE-370) 76 % injection 100 mL (100 mL Intravenous Given 6/18/25 0231)    Results were reviewed and plan of care discussed during repeat physical exam.  Exam is unchanged from previous, patient remains alert and oriented, nontoxic in appearance GCS 15.  Patient is informed of plan of care will be to establish a regular primary care provider and then utilize them for further evaluation of lower back pain radiating into the leg.  Patient verbalizes understanding of and is amenable to this plan of care.    The following discharge instructions were  given to the patient:  You were seen tonight in the emergency department to evaluate for lower back pain which radiates into your hip and leg.  X-rays of your lower back and pelvis were negative for any fracture or dislocation and a CT scan of your abdomen and pelvis was negative for any acute or emergent findings.  Lab work was also all unremarkable with no abnormal findings needing to be addressed.  Your plan of care will be to take the Robaxin as needed for pain and take ibuprofen 3 times a day as directed for 10 days.  Also please contact patient phyllis Vogt, listed in these discharge instructions, for assistance in obtaining primary care.  Your primary care provider is crucial to you receiving further evaluation and treatment if indicated for tonight's complaint.  Return to the emergency department if complaint worsens with worsening pain, numbness in the hip or leg, inability to bear weight due to pain, or with any other medical complaint or concern.    Appropriate PPE worn during exam.    I discussed findings with patient who voiced understanding of discharge instructions, signs and symptoms requiring return to ED; discharged improved and in stable condition with follow up for re-evaluation.  This document is intended for medical expert use only. Reading of this document by patients and/or patient's family without participating medical staff guidance may result in misinterpretation and unintended morbidity.  Any interpretation of such data is the responsibility of the patient and/or family member responsible for the patient in concert with their primary or specialist providers, not to be left for sources of online searches such as [employer], [employer] or similar queries. Relying on these approaches to knowledge may result in misinterpretation, misguided goals of care and even death should patients or family members try recommendations outside of the realm of professional medical care in a supervised inpatient  environment.         Problems Addressed:  Acute left-sided low back pain with left-sided sciatica: complicated acute illness or injury  Lumbar radiculopathy: complicated acute illness or injury    Amount and/or Complexity of Data Reviewed  Labs: ordered.  Radiology: ordered.    Risk  Prescription drug management.        Final diagnoses:   Acute left-sided low back pain with left-sided sciatica   Lumbar radiculopathy       ED Disposition  ED Disposition       ED Disposition   Discharge    Condition   Stable    Comment   --               [facility]  [place] [address]  [phone]             Medication List        New Prescriptions      methocarbamol 500 MG tablet  Commonly known as: ROBAXIN  Take 1 tablet by mouth 2 (Two) Times a Day As Needed for Muscle Spasms for up to 15 doses.            Changed      * ibuprofen 800 MG tablet  Commonly known as: ADVIL,MOTRIN  Take 1 tablet by mouth 3 (Three) Times a Day With Meals for 10 days.  What changed: You were already taking a medication with the same name, and this prescription was added. Make sure you understand how and when to take each.     * ibuprofen 800 MG tablet  Commonly known as: ADVIL,MOTRIN  What changed: Another medication with the same name was added. Make sure you understand how and when to take each.           * This list has 2 medication(s) that are the same as other medications prescribed for you. Read the directions carefully, and ask your doctor or other care provider to review them with you.                   Where to Get Your Medications        These medications were sent to [facility] [id number] [facility] [place] - [address] - [phone]  - [phone]   [address] [place] [address]      Phone: [phone]   ibuprofen 800 MG tablet  methocarbamol 500 MG tablet            Raulito Pride PA-C  06/18/25 0332

## (undated) DEVICE — KT SURG TURNOVER 050

## (undated) DEVICE — PK CYSTO 50

## (undated) DEVICE — PRT BIOP SEALS

## (undated) DEVICE — SOL IRRG H2O BG 3000ML STRL

## (undated) DEVICE — BNDG GZ SOF-FORM CONFRM 3X75IN LF STRL

## (undated) DEVICE — GLV SURG SIGNATURE ESSENTIAL PF LTX SZ7

## (undated) DEVICE — TUBING, SUCTION, 1/4" X 12', STRAIGHT: Brand: MEDLINE

## (undated) DEVICE — SYS IRR PUMP SGL ACTN VAC SYR 10CC

## (undated) DEVICE — SOLUTION,WATER,IRRIGATION,1000ML,STERILE: Brand: MEDLINE

## (undated) DEVICE — DRSNG GZ PETROLTM XEROFORM CURAD 1X8IN STRL

## (undated) DEVICE — PREP SPRY PVPI 10P 2OZ

## (undated) DEVICE — GW FIX CORE JB FLX PTFE .035 15X145CM

## (undated) DEVICE — DUAL LUMEN URETERAL CATHETER

## (undated) DEVICE — NITINOL WIRE WITH HYDROPHILIC TIP: Brand: SENSOR

## (undated) DEVICE — 4.0MM OVAL SOLID CARBIDE BUR MEDIUM

## (undated) DEVICE — TOWEL,OR,DSP,ST,NATURAL,DLX,4/PK,20PK/CS: Brand: MEDLINE